# Patient Record
Sex: FEMALE | Race: BLACK OR AFRICAN AMERICAN | NOT HISPANIC OR LATINO | Employment: OTHER | URBAN - METROPOLITAN AREA
[De-identification: names, ages, dates, MRNs, and addresses within clinical notes are randomized per-mention and may not be internally consistent; named-entity substitution may affect disease eponyms.]

---

## 2017-01-18 ENCOUNTER — TRANSFERRED RECORDS (OUTPATIENT)
Dept: HEALTH INFORMATION MANAGEMENT | Facility: CLINIC | Age: 72
End: 2017-01-18

## 2017-02-22 ENCOUNTER — TRANSFERRED RECORDS (OUTPATIENT)
Dept: HEALTH INFORMATION MANAGEMENT | Facility: CLINIC | Age: 72
End: 2017-02-22

## 2017-02-23 ENCOUNTER — TRANSFERRED RECORDS (OUTPATIENT)
Dept: HEALTH INFORMATION MANAGEMENT | Facility: CLINIC | Age: 72
End: 2017-02-23

## 2017-08-24 ENCOUNTER — TRANSFERRED RECORDS (OUTPATIENT)
Dept: HEALTH INFORMATION MANAGEMENT | Facility: CLINIC | Age: 72
End: 2017-08-24

## 2017-11-13 ENCOUNTER — TRANSFERRED RECORDS (OUTPATIENT)
Dept: HEALTH INFORMATION MANAGEMENT | Facility: CLINIC | Age: 72
End: 2017-11-13

## 2017-11-14 ENCOUNTER — TRANSFERRED RECORDS (OUTPATIENT)
Dept: HEALTH INFORMATION MANAGEMENT | Facility: CLINIC | Age: 72
End: 2017-11-14

## 2018-03-05 ENCOUNTER — TRANSFERRED RECORDS (OUTPATIENT)
Dept: HEALTH INFORMATION MANAGEMENT | Facility: CLINIC | Age: 73
End: 2018-03-05

## 2018-07-23 ENCOUNTER — TRANSFERRED RECORDS (OUTPATIENT)
Dept: HEALTH INFORMATION MANAGEMENT | Facility: CLINIC | Age: 73
End: 2018-07-23

## 2018-11-16 ENCOUNTER — TRANSFERRED RECORDS (OUTPATIENT)
Dept: HEALTH INFORMATION MANAGEMENT | Facility: CLINIC | Age: 73
End: 2018-11-16

## 2018-12-31 ENCOUNTER — TRANSFERRED RECORDS (OUTPATIENT)
Dept: HEALTH INFORMATION MANAGEMENT | Facility: CLINIC | Age: 73
End: 2018-12-31

## 2019-04-08 ENCOUNTER — TRANSFERRED RECORDS (OUTPATIENT)
Dept: HEALTH INFORMATION MANAGEMENT | Facility: CLINIC | Age: 74
End: 2019-04-08

## 2019-11-18 ENCOUNTER — TRANSFERRED RECORDS (OUTPATIENT)
Dept: HEALTH INFORMATION MANAGEMENT | Facility: CLINIC | Age: 74
End: 2019-11-18

## 2019-11-19 ENCOUNTER — TRANSFERRED RECORDS (OUTPATIENT)
Dept: HEALTH INFORMATION MANAGEMENT | Facility: CLINIC | Age: 74
End: 2019-11-19

## 2020-02-11 NOTE — TELEPHONE ENCOUNTER
ONCOLOGY INTAKE: Records Information      APPT INFORMATION:  Referring provider: Self  Referring provider s clinic:  N/a  Reason for visit/diagnosis:  History of breast cancer. Transfer of care for long term follow up  Has patient been notified of appointment date and time?: Yes    RECORDS INFORMATION:  Were the records received with the referral (via [employer])? No    Has patient been seen for any external appt for this diagnosis? Yes    If yes, where? [facility] ([facility])    Has patient had any imaging or procedures outside of [place]  [place] for this condition? Yes      If Yes, where?  [facility] ([facility])    ADDITIONAL INFORMATION:  None

## 2020-02-18 NOTE — TELEPHONE ENCOUNTER
Reschedule 2/21 visit with Dr. Lacy to 4/16 with Dr. Slater due to scheduling error. Spoke with Pt.    Please see 2/21 pre-visit with Dr. Lacy

## 2020-02-19 ENCOUNTER — RECORDS - HEALTHEAST (OUTPATIENT)
Dept: ADMINISTRATIVE | Facility: OTHER | Age: 75
End: 2020-02-19

## 2020-02-21 ENCOUNTER — PRE VISIT (OUTPATIENT)
Dept: ONCOLOGY | Facility: CLINIC | Age: 75
End: 2020-02-21

## 2020-03-03 ENCOUNTER — HOSPITAL ENCOUNTER (OUTPATIENT)
Dept: ULTRASOUND IMAGING | Facility: CLINIC | Age: 75
Discharge: HOME | End: 2020-03-03
Attending: INTERNAL MEDICINE

## 2020-03-03 DIAGNOSIS — N18.9 CHRONIC KIDNEY DISEASE, UNSPECIFIED CKD STAGE: ICD-10-CM

## 2020-04-15 ENCOUNTER — PATIENT OUTREACH (OUTPATIENT)
Dept: ONCOLOGY | Facility: CLINIC | Age: 75
End: 2020-04-15

## 2020-04-15 NOTE — PROGRESS NOTES
Received a message that Estephania is declining to have a virtual or phone visit with Dr Slater.    Spoke to Estephania to discuss rational for a phone visit due to COVID 19. She is in agreement with changing to a phone visit at this time and seeing Dr Slater in person at a later date.

## 2020-04-16 ENCOUNTER — VIRTUAL VISIT (OUTPATIENT)
Dept: ONCOLOGY | Facility: CLINIC | Age: 75
End: 2020-04-16
Attending: INTERNAL MEDICINE
Payer: COMMERCIAL

## 2020-04-16 ENCOUNTER — PRE VISIT (OUTPATIENT)
Dept: ONCOLOGY | Facility: CLINIC | Age: 75
End: 2020-04-16

## 2020-04-16 DIAGNOSIS — Z79.811 LONG TERM CURRENT USE OF AROMATASE INHIBITOR: ICD-10-CM

## 2020-04-16 DIAGNOSIS — Z85.3 PERSONAL HISTORY OF MALIGNANT NEOPLASM OF BREAST: Primary | ICD-10-CM

## 2020-04-16 PROCEDURE — 99215 OFFICE O/P EST HI 40 MIN: CPT | Mod: 95 | Performed by: INTERNAL MEDICINE

## 2020-04-16 PROCEDURE — 40000114 ZZH STATISTIC NO CHARGE CLINIC VISIT

## 2020-04-16 RX ORDER — LISINOPRIL 10 MG/1
10 TABLET ORAL DAILY
COMMUNITY
End: 2023-03-31

## 2020-04-16 RX ORDER — INSULIN GLARGINE 100 [IU]/ML
38 INJECTION, SOLUTION SUBCUTANEOUS 2 TIMES DAILY
COMMUNITY
Start: 2020-02-13

## 2020-04-16 RX ORDER — ASPIRIN 81 MG/1
81 TABLET, CHEWABLE ORAL DAILY
COMMUNITY
End: 2023-03-16

## 2020-04-16 RX ORDER — ROSUVASTATIN CALCIUM 10 MG/1
10 TABLET, COATED ORAL DAILY
COMMUNITY

## 2020-04-16 RX ORDER — LETROZOLE 2.5 MG/1
2.5 TABLET, FILM COATED ORAL DAILY
COMMUNITY
Start: 2019-03-14 | End: 2021-11-05

## 2020-04-16 NOTE — PROGRESS NOTES
"Estephania Rubio is a 74 year old female who is being evaluated via a billable telephone visit.      Please call patient on Home phone.     The patient has been notified of following:     \"This telephone visit will be conducted via a call between you and your physician/provider. We have found that certain health care needs can be provided without the need for a physical exam.  This service lets us provide the care you need with a short phone conversation.  If a prescription is necessary we can send it directly to your pharmacy.  If lab work is needed we can place an order for that and you can then stop by our lab to have the test done at a later time.    Telephone visits are billed at different rates depending on your insurance coverage. During this emergency period, for some insurers they may be billed the same as an in-person visit.  Please reach out to your insurance provider with any questions.    If during the course of the call the physician/provider feels a telephone visit is not appropriate, you will not be charged for this service.\"     Physician has received verbal consent for a Telephone Visit from the patient? Yes    How would you like to obtain your AVS? Mail a copy    Estephania Rubio complains of    Chief Complaint   Patient presents with     Telephone     TELEPHONE VISIT; BREAST CANCER        Allergies reviewed: Yes  Medications reviewed: Yes    Medications: Medication refills not needed today.  Pharmacy name entered into EPIC: Data Unavailable      Zainab Romano CMA April 16, 2020  8:42 AM     ALLERGIES  Patient has no allergy information on record.      NEW PATIENT TELEPHONE VISIT      HISTORY OF PRESENT ILLNESS:  I am asked to see Ms. Estephania Rubio as a self referral for a transfer of care of breast cancer as she moved to the [facility].      Estephania is a 74-year-old postmenopausal female with a history of an early stage breast cancer treated in 04/2017.  She reports that she completed therapy 3 " years ago.  I do not have copies of all of her records.  She reports she was treated in [place] in [place], [place], where she had a screening mammogram and was found to have an abnormality.  This was an estrogen receptor-positive, HER2-negative breast cancer.  She was treated with lumpectomy and adjuvant radiation therapy.  She reports she did not require chemotherapy and she started on letrozole.  She has continued on letrozole since that time.  She reports no issues with hot flashes.  She has occasional arthralgias and myalgias.  She does not report having had a bone density scan done.  She last had a screening mammogram in 2019.      She reports overall that she is feeling well.  She has no nodules, lumps or bumps.  No masses.  She reports no fevers or chills, no chest pain or shortness of breath.  No issues with nausea, vomiting, diarrhea or constipation.      She recently moved to the [facility] approximately 3 months ago to be closer to her daughter who lives in [place].  [place]      REVIEW OF SYSTEMS:  Her 10-point review of systems is otherwise negative.      PAST MEDICAL HISTORY:  Significant for type 2 diabetes, hypertension, breast cancer as outlined above, chronic kidney disease, stage IIIB, with a creatinine of 1.8.      MEDICATIONS:  Reviewed in EMR.      ALLERGIES:  No known drug allergies.      SOCIAL HISTORY:  She is single.  She is originally from [place].  She previously went to the [facility] for human rights.  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  She has 2 children, a daughter who lives in [place], a son who lives in [place], [place].  She currently resides in a senior living facility.  No tobacco or alcohol use.      FAMILY HISTORY:  Her sister  of breast cancer at the age of 68 with metastatic disease.  She has other family  members with breast cancer.  She reports she was offered genetic testing and declined this.      PHYSICAL EXAMINATION:  No physical examination was performed today as this was a telephone encounter.  She reports her baseline weight is approximately 160 pounds.  She had a full affect today.      Outside imaging and laboratories were reviewed.  Creatinine of 1.8.  White blood cell count 10.3, hemoglobin 12.8.      Estephania is a 74-year-old female, postmenopausal, with a history of a total abdominal hysterectomy, bilateral salpingo-oophorectomy and a breast cancer treated with a lumpectomy and radiation therapy, completing all therapy in 04/2017.  She did not require chemotherapy.  I do not have staging information.  This was an estrogen receptor-positive, HER2-negative breast cancer.      1.  Breast cancer.  In discussion with Estephania today, she has no concerning symptoms for metastatic disease based on history over the telephone.  I discussed with her I am working on getting her outside records.  I would like her to return in November with a mammogram as well as a bone density scan.  Bone density scan is given the fact that her letrozole can cause bone thinning and osteoporosis.  She will be due at that time.      She will continue her Femara and call if she develops any other symptoms.      2.  Chronic kidney disease, stage III disease.  She has been in contact with Nephrology at [facility] and a workup is in process.      3.  Diabetes.  She is currently on metformin and insulin and is following up with primary care.      I discussed with her from a breast cancer prevention perspective, we recommend regular exercise of at least 150 minutes per week of moderate intensity exercise.  This has been difficult given the COVID  restrictions as well as her moving into a new place.  She is hopeful to begin restarting this.      4.  Health care maintenance.  She reports she had a colonoscopy approximately 3 years ago.  She has been  plugged in with primary care and is working to establish care now that she lives in the [facility].      She has declined genetic testing.      She is coping well.  It was a pleasure to talk to MsJustin Ramiro today.        Phone call duration: 45 minutes    Katie Slater MD

## 2020-04-16 NOTE — TELEPHONE ENCOUNTER
RECORDS STATUS - BREAST    RECORDS REQUESTED FROM: [facility] in [place], [place]    DATE REQUESTED: 4/16/2020    NOTES DETAILS STATUS   OFFICE NOTE from referring provider     OFFICE NOTE from medical oncologist     OFFICE NOTE from surgeon     OFFICE NOTE from radiation oncologist     DISCHARGE SUMMARY from hospital     DISCHARGE REPORT from the ER     OPERATIVE REPORT     MEDICATION LIST     CLINICAL TRIAL TREATMENTS TO DATE     LABS     PATHOLOGY REPORTS  (Tissue diagnosis, Stage, ER/CT percentage positive and intensity of staining, HER2 IHC, FISH, and all biopsies from breast and any distant metastasis)                 Pathology Dept:   Ph: [phone]  Fax: [phone]   Mailing Address:   [address] [place] [address]   They confirmed that there are breast cancer and lymph node pathology slides from 2016 at their facility.     I sent an Urgent fax request to the pathology dept.  Tracking Number:    [id number]    4/17/2020 11:05am   I received a call back from [facility] Pathology Dept- they got the request for path slides yesterday and need to cut the slides from the block. The slides are older so they had to request them from an off-site facility. The slides will be sent out either Monday or Tuesday.       GENONOMIC TESTING     TYPE:   (Next Generation Sequencing, including Foundation One testing, and Oncotype score)     IMAGING (NEED IMAGES & REPORT)     CT SCANS     MRI       MAMMO I spoke to Christine in the Radiology Dept- She confirmed that the pt has mammos and ultrasounds at their facility.     Ph: [phone] #2  Fax: [phone]    IMG disc tracking Number:     [id number]     Complete- IMG disc arrived on 4/17/2020 11/18/2019, 11/16/2018, 12/22/2016, 12/22/2016, 11/13/2017 more in PACS - Mammo   ULTRASOUND Complete US Breast 11/18/2019, 12/31/2018, 12/22/2016 more in PACS   PET     BONE SCAN     BRAIN MRI       Action    Action Taken 4/16/2020 8:59am   I called the main hospital   at [facility] in [facility] Ph: [phone] and they transferred me to the medical records dept.     The Medical Records Dept  (Ph: [phone]) said they will send the records right away but need a formal fax request. I sent a request for records to fax: [phone]. I spoke to a woman named Addis.     I called the Hospital  back to get the phone numbers for the Radiology and Pathology Depts.     Radiology Dept   Ph: [phone] #2- I left a vm requesting a call back.     Pathology Dept:   Ph: [phone]  Fax: [phone]   Mailing Address:   [address] [place] [address]   They confirmed that there are breast cancer and lymph node pathology slides from 2016 at their facility.     I sent an Urgent fax request to the pathology dept.  Tracking Number:     Christine from the Health system Radiology Dept called me back and confirmed that they have Mammos and Ultrasound Images on this pt.   Fax: [phone]    I called the Medical Records Dept back and spoke to a woman named Julia. She said the fax was just received.     9:44am  I sent a fax request and shipping labels to the radiology and pathology depts at [facility] in [facility]     10:21am   I left a vm for pt Estephania requesting a call back.     [facility] sent a fax back requesting an SHIRA for samia Best's medical records. Pending: we need a release form from pt Estephania.     11:35am   I received IMG reports from [facility] and faxed them to HIM.     I also sent a message to Dr. Slater/ clinical staff requesting a direct fax and to see if they had the patient sign a release form today.     3:14pm   I was able to reach pt Estephania and sent her an SHIRA email: [email or web address]. I sent samia Best an SHIRA in the mail.     4/17/2020 10:58am   I called Health system Pathology Dept Ph: [phone]-   I left a detailed vm for the path dept.     11am - I left a vm for pt Estephania requesting a call back and checking on the email I sent yesterday.     4/21/2020  10:58am   I called samia Estephania to check in again - She answered and said the SHIRA letter came in the mail today. She's going to sign it and mail back the return mail envelope soon.     5/5/2020 1:09pm     I received samia Estephania's SHIRA in the mail today and faxed it over to [facility] and to HIM.     3:28pm   I called  [facility] to see if they received the fax request (Ph: [phone]) I left a vm requesting a call back.

## 2020-04-20 NOTE — TELEPHONE ENCOUNTER
Action 04/20/20 12:08 PM - Estrellita   Action Taken  Pathology received from [facility] in [place] and sent to be reviewed with filled out Pathology Form.

## 2020-04-21 PROCEDURE — 00000346 ZZHCL STATISTIC REVIEW OUTSIDE SLIDES TC 88321: Performed by: INTERNAL MEDICINE

## 2020-05-08 LAB — COPATH REPORT: NORMAL

## 2020-05-12 ENCOUNTER — TELEPHONE (OUTPATIENT)
Dept: ONCOLOGY | Facility: CLINIC | Age: 75
End: 2020-05-12

## 2020-05-12 NOTE — TELEPHONE ENCOUNTER
Action 05/12/20 12:08 PM - Estrellita   Action Taken  Over 100 pages of records received from [facility] in [place], [place] and sent to [facility] to be scanned into the chart.

## 2020-09-10 ENCOUNTER — TELEPHONE (OUTPATIENT)
Dept: ONCOLOGY | Facility: CLINIC | Age: 75
End: 2020-09-10

## 2020-09-10 NOTE — TELEPHONE ENCOUNTER
"Pt CO Left breast pain and a hard lump (which the Pt insists is not a lump but \"tissue\") in the axillary region about the size of a grape. No redness or swelling. Pain is rated 6-7/10 when touched. Pt was unable to describe pain quality. Pt has no other Sx.  Pt has appointment on 10/5 and wonders if she should be seen earlier.  Paged Dr Slater  Per Dr Slater Pt should be seen, can see Pt on 9/14 over lunch or @ 930 on 9/17. Pt needs a diagnostic mammogram prior, previous mammo should be altered. Informed Pt who verbalized understanding and messaged scheduling.  "

## 2020-09-11 ENCOUNTER — PATIENT OUTREACH (OUTPATIENT)
Dept: ONCOLOGY | Facility: CLINIC | Age: 75
End: 2020-09-11

## 2020-09-11 DIAGNOSIS — N64.4 BREAST PAIN: ICD-10-CM

## 2020-09-11 DIAGNOSIS — Z85.3 PERSONAL HISTORY OF MALIGNANT NEOPLASM OF BREAST: Primary | ICD-10-CM

## 2020-09-14 ENCOUNTER — ANCILLARY PROCEDURE (OUTPATIENT)
Dept: MAMMOGRAPHY | Facility: CLINIC | Age: 75
End: 2020-09-14
Payer: COMMERCIAL

## 2020-09-14 ENCOUNTER — TELEPHONE (OUTPATIENT)
Dept: ONCOLOGY | Facility: CLINIC | Age: 75
End: 2020-09-14

## 2020-09-14 ENCOUNTER — ONCOLOGY VISIT (OUTPATIENT)
Dept: ONCOLOGY | Facility: CLINIC | Age: 75
End: 2020-09-14
Attending: INTERNAL MEDICINE
Payer: COMMERCIAL

## 2020-09-14 VITALS
OXYGEN SATURATION: 99 % | HEART RATE: 71 BPM | WEIGHT: 196 LBS | SYSTOLIC BLOOD PRESSURE: 161 MMHG | DIASTOLIC BLOOD PRESSURE: 92 MMHG | TEMPERATURE: 98.7 F | RESPIRATION RATE: 16 BRPM

## 2020-09-14 DIAGNOSIS — C50.412 MALIGNANT NEOPLASM OF UPPER-OUTER QUADRANT OF LEFT BREAST IN FEMALE, ESTROGEN RECEPTOR POSITIVE (H): ICD-10-CM

## 2020-09-14 DIAGNOSIS — N64.4 BREAST PAIN: ICD-10-CM

## 2020-09-14 DIAGNOSIS — Z17.0 MALIGNANT NEOPLASM OF UPPER-OUTER QUADRANT OF LEFT BREAST IN FEMALE, ESTROGEN RECEPTOR POSITIVE (H): ICD-10-CM

## 2020-09-14 DIAGNOSIS — Z85.3 PERSONAL HISTORY OF MALIGNANT NEOPLASM OF BREAST: ICD-10-CM

## 2020-09-14 DIAGNOSIS — Z79.811 USE OF AROMATASE INHIBITORS: ICD-10-CM

## 2020-09-14 DIAGNOSIS — R92.8 ABNORMAL FINDING ON BREAST IMAGING: Primary | ICD-10-CM

## 2020-09-14 PROCEDURE — 99214 OFFICE O/P EST MOD 30 MIN: CPT | Mod: ZP | Performed by: INTERNAL MEDICINE

## 2020-09-14 PROCEDURE — G0463 HOSPITAL OUTPT CLINIC VISIT: HCPCS | Mod: ZF

## 2020-09-14 RX ORDER — GLIPIZIDE 10 MG/1
10 TABLET, FILM COATED, EXTENDED RELEASE ORAL
COMMUNITY
Start: 2020-08-28 | End: 2023-03-31

## 2020-09-14 ASSESSMENT — PAIN SCALES - GENERAL: PAINLEVEL: NO PAIN (0)

## 2020-09-14 NOTE — LETTER
9/14/2020         RE: Estephania Rubio  [address]  [facility] [address]        Dear Colleague,    Thank you for referring your patient, Estephania Rubio, to the [facility]. Please see a copy of my visit note below.    Oncology Follow-up Visit:  September 14, 2020    Diagnosis: left breast cancer Er + her2 negative on letrozole    History Of Present Illness:  Ms. Rubio is a 75 year old female is here for follow-up of breast cancer.    Estephania is a 75-year-old postmenopausal female with a history of an early stage breast cancer treated in 04/2017.  She reports that she completed therapy 3 years ago.  I do not have copies of all of her records.  She reports she was treated in [place] in [place], [place], where she had a screening mammogram and was found to have an abnormality.  This was an estrogen receptor-positive, HER2-negative breast cancer.  She was treated with lumpectomy and adjuvant radiation therapy.  She reports she did not require chemotherapy and she started on letrozole.  She has continued on letrozole since that time.  She reports no issues with hot flashes.  She has occasional arthralgias and myalgias.  She does not report having had a bone density scan done.  She last had a screening mammogram in 11/2019.      She reports overall that she is feeling well.  She has no nodules, lumps or bumps.  No masses.  She reports no fevers or chills, no chest pain or shortness of breath.  No issues with nausea, vomiting, diarrhea or constipation.     Estephania comes in today on an add on basis complaining of tenderness in her left breast and a fullness in her left axilla.  She reports she has had intermittent tenderness in her left breast since her treatment.  She thinks it is more prominent now.   She is unsure whether she has noticed a mass or lump.      No f/c.  No chest pain or shortness of breath.    She reports a decrease in her energy overall over the last several  "months.  She is not sure if this is related to covid, or a lack of motivation or other.      She has no other pain.      She is an active \"busy body\" but doesn't feel like doing as much lately.    Review Of Systems:   ROS: 10 point ROS neg other than the symptoms noted above in the HPI.      Past medical, social, surgical, and family histories reviewed.    Allergies:  Allergies as of 09/14/2020     (No Known Allergies)       Current Medications:  Current Outpatient Medications   Medication Sig Dispense Refill     aspirin (ASA) 81 MG chewable tablet Take 81 mg by mouth daily       empagliflozin (JARDIANCE) 10 MG TABS tablet Take 10 mg by mouth daily       insulin glargine (BASAGLAR KWIKPEN) 100 UNIT/ML pen TAKE 55 UNITS DAILY       letrozole (FEMARA) 2.5 MG tablet Take 2.5 mg by mouth daily       lisinopril (ZESTRIL) 10 MG tablet Take 10 mg by mouth daily       rosuvastatin (CRESTOR) 10 MG tablet Take 10 mg by mouth daily       sitagliptin (JANUVIA) 100 MG tablet Take 100 mg by mouth daily          Physical Exam:  There were no vitals taken for this visit.    GENERAL APPEARANCE: healthy, alert and no distress     HENT: Mouth without ulcers or lesions     NECK: no adenopathy, no asymmetry or masses     LYMPHATICS: No cervical, supraclavicular, axillary or inguinal lymphadenopathy     RESP: lungs clear to auscultation - no rales, rhonchi or wheezes     CARDIOVASCULAR: regular rates and rhythm, normal S1 S2, no S3 or S4 and no murmur.     BREAST:  Symmetric, no masses or axillary adenopathy in the right breast, scar in UOQ of the left breast with associated tenderness.  I do not appreciate any axillary adenopathy or masses in the breast.  She does have a subcentimeter cutaneous lesion in the skin in the axilla that is soft, movable.  No associated erythema.     ABDOMEN:  soft, nontender, no HSM or masses and bowel sounds normal     MUSCULOSKELETAL: extremities normal- no gross deformities noted, no evidence of " inflammation in joints, FROM in all extremities. No edema b/l LE.     SKIN: no suspicious lesions or rashes     PSYCHIATRIC: mentation appears normal and affect normal    Laboratory/Imaging Studies  No visits with results within 2 Week(s) from this visit.   Latest known visit with results is:   Orders Only on 04/21/2020   Component Date Value Ref Range Status     Copath Report 04/21/2020    Final                    Value:Patient Name: MIMI JONES  MR#: [id number]  Specimen #: [id number]  Collected: 4/21/2020  Received: 4/21/2020  Reported: 5/8/2020 17:43  Ordering Phy(s): [facility]  Copy To: [facility]  Department of Pathology  [facility] [place] [facility] [place]  [address]  Phone: [phone]  Fax: [phone]    For improved result formatting, select 'View Enhanced Report Format' under   Linked Documents section.    TEST(S):  A: 1 Slide, case #[id number]  B: 1 Slide, case #[id number]    FINAL DIAGNOSIS:  I. 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 CASE FROM [place], [place], ([id number],   OBTAINED 12/22/2016):    C. 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    One representative H&E slide is provided from the left breast lumpectomy   specimen (slide C2).  Per outside  report, atypical lobular hyperplasia was also present, surgical margins   were uninvolved by carcinoma, and no  lymphovascular invasion was identified.  Per outside report, one left   axillary sentinel lymph node and three  left axillary non-sentinel lymph nodes were removed and were benign.    I have personally reviewed all specimens and/or                           slides, including the   listed special stains, and used them  with my medical judgement to determine or confirm the final diagnosis.    Electronically signed out by:    Daisy Urias M.D., [facility]    CLINICAL HISTORY:  The patient is a 75-year-old woman.    GROSS:  Received from [facility] in [place], [place], is 1 stained   slide labeled [id number] (obtained  11/11/2016), 1 stained slide labeled [id number] (obtained 12/22/2016) and a   copy of the referring pathologist's  report with patient identifying information.  All slides are returned.    One slide (C2) is digitally scanned  prior to return.    MICROSCOPIC:  Microscopic examination is performed.    The technical component of this testing was completed at the [facility], with the professional component performed   at the [facility], [address] [place] [id number] ([phone])                              CPT Codes:  A: 32078-UBB9  B: 32944-GZN9    COLLECTION SITE:  Client:  [facility]  [facility]           ASSESSMENT/PLAN:    Estephania is a 75-year-old female, postmenopausal, with a history of a total abdominal hysterectomy, bilateral salpingo-oophorectomy and a breast cancer treated with a lumpectomy and radiation therapy, completing all therapy  in 04/2017.  She did not require chemotherapy.  I do not have staging information.  This was an estrogen receptor-positive, HER2-negative breast cancer.      1.  Breast cancer.   I reviewed her imaging from today.  It is recommended that Estephania undergo a biopsy.  We discussed this could be fibrosis vs fat necrosis vs tumor vs other . Await biopsy.    Assuming this is negative, will have patient return in 6 months.       She will continue her Femara and call if she develops any other symptoms.      2.  Chronic kidney disease, stage III disease.  She has been in contact with Nephrology at [facility] and a workup is in process.      3.  Diabetes.  She is currently on metformin and insulin and is following up with primary care.        4.  Health care maintenance.  She reports she had a colonoscopy approximately 3 years ago.  She has been plugged in with primary care and is working to establish care now that she lives in the [facility].      She has declined genetic testing.      She is coping well.  It was a pleasure to talk to Ms. Rubio today.          Again, thank you for allowing me to participate in the care of your patient.        Sincerely,        Katie Slater MD

## 2020-09-14 NOTE — NURSING NOTE
Oncology Rooming Note    September 14, 2020 12:38 PM   Estephania Rubio is a 75 year old female who presents for:    Chief Complaint   Patient presents with     Oncology Clinic Visit     Return; Hx of Breast Ca     Initial Vitals: BP (!) 161/92   Pulse 71   Temp 98.7  F (37.1  C) (Oral)   Resp 16   Wt 88.9 kg (196 lb)   SpO2 99%  There is no height or weight on file to calculate BMI. There is no height or weight on file to calculate BSA.  No Pain (0) Comment: Data Unavailable   No LMP recorded. Patient has had a hysterectomy.  Allergies reviewed: Yes  Medications reviewed: Yes    Medications: Medication refills not needed today.  Pharmacy name entered into [facility]: [id number] [place] [address]    Clinical concerns: Pt declined height. Dr Slater was NOT notified.      Shreya Campbell [facility]

## 2020-09-14 NOTE — PROGRESS NOTES
"Oncology Follow-up Visit:  September 14, 2020    Diagnosis: left breast cancer Er + her2 negative on letrozole    History Of Present Illness:  Ms. Rubio is a 75 year old female is here for follow-up of breast cancer.    Estephania is a 75-year-old postmenopausal female with a history of an early stage breast cancer treated in 04/2017.  She reports that she completed therapy 3 years ago.  I do not have copies of all of her records.  She reports she was treated in [place] in [place], [place], where she had a screening mammogram and was found to have an abnormality.  This was an estrogen receptor-positive, HER2-negative breast cancer.  She was treated with lumpectomy and adjuvant radiation therapy.  She reports she did not require chemotherapy and she started on letrozole.  She has continued on letrozole since that time.  She reports no issues with hot flashes.  She has occasional arthralgias and myalgias.  She does not report having had a bone density scan done.  She last had a screening mammogram in 11/2019.      She reports overall that she is feeling well.  She has no nodules, lumps or bumps.  No masses.  She reports no fevers or chills, no chest pain or shortness of breath.  No issues with nausea, vomiting, diarrhea or constipation.     Estephania comes in today on an add on basis complaining of tenderness in her left breast and a fullness in her left axilla.  She reports she has had intermittent tenderness in her left breast since her treatment.  She thinks it is more prominent now.   She is unsure whether she has noticed a mass or lump.      No f/c.  No chest pain or shortness of breath.    She reports a decrease in her energy overall over the last several months.  She is not sure if this is related to covid, or a lack of motivation or other.      She has no other pain.      She is an active \"busy body\" but doesn't feel like doing as much lately.    Review Of Systems:   ROS: 10 point ROS neg other than the symptoms " noted above in the HPI.      Past medical, social, surgical, and family histories reviewed.    Allergies:  Allergies as of 09/14/2020     (No Known Allergies)       Current Medications:  Current Outpatient Medications   Medication Sig Dispense Refill     aspirin (ASA) 81 MG chewable tablet Take 81 mg by mouth daily       empagliflozin (JARDIANCE) 10 MG TABS tablet Take 10 mg by mouth daily       insulin glargine (BASAGLAR KWIKPEN) 100 UNIT/ML pen TAKE 55 UNITS DAILY       letrozole (FEMARA) 2.5 MG tablet Take 2.5 mg by mouth daily       lisinopril (ZESTRIL) 10 MG tablet Take 10 mg by mouth daily       rosuvastatin (CRESTOR) 10 MG tablet Take 10 mg by mouth daily       sitagliptin (JANUVIA) 100 MG tablet Take 100 mg by mouth daily          Physical Exam:  There were no vitals taken for this visit.    GENERAL APPEARANCE: healthy, alert and no distress     HENT: Mouth without ulcers or lesions     NECK: no adenopathy, no asymmetry or masses     LYMPHATICS: No cervical, supraclavicular, axillary or inguinal lymphadenopathy     RESP: lungs clear to auscultation - no rales, rhonchi or wheezes     CARDIOVASCULAR: regular rates and rhythm, normal S1 S2, no S3 or S4 and no murmur.     BREAST:  Symmetric, no masses or axillary adenopathy in the right breast, scar in UOQ of the left breast with associated tenderness.  I do not appreciate any axillary adenopathy or masses in the breast.  She does have a subcentimeter cutaneous lesion in the skin in the axilla that is soft, movable.  No associated erythema.     ABDOMEN:  soft, nontender, no HSM or masses and bowel sounds normal     MUSCULOSKELETAL: extremities normal- no gross deformities noted, no evidence of inflammation in joints, FROM in all extremities. No edema b/l LE.     SKIN: no suspicious lesions or rashes     PSYCHIATRIC: mentation appears normal and affect normal    Laboratory/Imaging Studies  No visits with results within 2 Week(s) from this visit.   Latest known  visit with results is:   Orders Only on 04/21/2020   Component Date Value Ref Range Status     Copath Report 04/21/2020    Final                    Value:Patient Name: MIMI JONES  MR#: [id number]  Specimen #: [id number]  Collected: 4/21/2020  Received: 4/21/2020  Reported: 5/8/2020 17:43  Ordering Phy(s): MT SUTHERLAND  Copy To: [facility]  Department of Pathology  [address] [place] [address].  [place] [address]  Phone: [phone]  Fax: [phone]    For improved result formatting, select 'View Enhanced Report Format' under   Linked Documents section.    TEST(S):  A: 1 Slide, case #[id number]  B: 1 Slide, case #[id number]    FINAL DIAGNOSIS:  I. CASE FROM [place], [place] ([id number],   OBTAINED 11/11/2016):  BREAST, LEFT, 1 O'CLOCK, 10 CM FROM NIPPLE, CORE BIOPSY  - INVASIVE MAMMARY CARCINOMA OF NO SPECIAL TYPE (INVASIVE DUCTAL   CARCINOMA), Bristol grade 1  - Invasive carcinoma is reportedly ER positive (100%, strong intensity),   AK positive (90%, intermediate  intensity) and HER2 negative (score 1+) (immunostains performed and   interpreted at the outside institution;  slides not prov                          ided for our review)    II. CASE FROM [facility], [place], [place], ([id number],   OBTAINED 12/22/2016):    C. BREAST, LEFT, WIRE LOCALIZED LUMPECTOMY:  - INVASIVE MAMMARY CARCINOMA OF NO SPECIAL TYPE (INVASIVE DUCTAL   CARCINOMA), Bristol grade 1 (tubule  formation 1+ nuclear pleomorphism 2+ mitotic activity 1 = Bristol score   4), size 6.5 mm per outside report  - Pathologic stage pT1bN0(sn) per outside report    COMMENT:  One representative H&E slide is provided from the left breast core biopsy   specimen (slide 1).    One representative H&E slide is provided from the left breast lumpectomy   specimen (slide C2).  Per outside  report, atypical lobular hyperplasia was also present, surgical margins   were uninvolved by carcinoma, and  no  lymphovascular invasion was identified.  Per outside report, one left   axillary sentinel lymph node and three  left axillary non-sentinel lymph nodes were removed and were benign.    I have personally reviewed all specimens and/or                           slides, including the   listed special stains, and used them  with my medical judgement to determine or confirm the final diagnosis.    Electronically signed out by:    Daisy Urias M.D., [facility]    CLINICAL HISTORY:  The patient is a 75-year-old woman.    GROSS:  Received from [facility] in [place], [place], is 1 stained   slide labeled [id number] (obtained  11/11/2016), 1 stained slide labeled [id number] (obtained 12/22/2016) and a   copy of the referring pathologist's  report with patient identifying information.  All slides are returned.    One slide (C2) is digitally scanned  prior to return.    MICROSCOPIC:  Microscopic examination is performed.    The technical component of this testing was completed at the [facility], with the professional component performed   at the [facility], [address] [place] [id number] ([phone])                              CPT Codes:  A: 82436-VCE9  B: 93958-LFJ8    COLLECTION SITE:  Client:  [facility]  Location:  [id number] (B)           ASSESSMENT/PLAN:    Estephania is a 75-year-old female, postmenopausal, with a history of a total abdominal hysterectomy, bilateral salpingo-oophorectomy and a breast cancer treated with a lumpectomy and radiation therapy, completing all therapy in 04/2017.  She did not require chemotherapy.  I do not have staging information.  This was an estrogen receptor-positive, HER2-negative breast cancer.      1.  Breast cancer.   I reviewed her imaging from today.  It is recommended that Estephania undergo a biopsy.  We  discussed this could be fibrosis vs fat necrosis vs tumor vs other . Await biopsy.    Assuming this is negative, will have patient return in 6 months.       She will continue her Femara and call if she develops any other symptoms.      2.  Chronic kidney disease, stage III disease.  She has been in contact with Nephrology at [facility] and a workup is in process.      3.  Diabetes.  She is currently on metformin and insulin and is following up with primary care.        4.  Health care maintenance.  She reports she had a colonoscopy approximately 3 years ago.  She has been plugged in with primary care and is working to establish care now that she lives in the [facility].      She has declined genetic testing.      She is coping well.  It was a pleasure to talk to Ms. Rubio today.

## 2020-09-17 ENCOUNTER — ANCILLARY PROCEDURE (OUTPATIENT)
Dept: MAMMOGRAPHY | Facility: CLINIC | Age: 75
End: 2020-09-17
Attending: INTERNAL MEDICINE
Payer: COMMERCIAL

## 2020-09-17 DIAGNOSIS — N63.0 BREAST MASS: ICD-10-CM

## 2020-09-17 RX ORDER — LIDOCAINE HYDROCHLORIDE 10 MG/ML
10 INJECTION, SOLUTION EPIDURAL; INFILTRATION; INTRACAUDAL; PERINEURAL ONCE
Status: COMPLETED | OUTPATIENT
Start: 2020-09-17 | End: 2020-09-17

## 2020-09-17 RX ADMIN — LIDOCAINE HYDROCHLORIDE 10 ML: 10 INJECTION, SOLUTION EPIDURAL; INFILTRATION; INTRACAUDAL; PERINEURAL at 14:30

## 2020-09-22 ENCOUNTER — TELEPHONE (OUTPATIENT)
Dept: MAMMOGRAPHY | Facility: CLINIC | Age: 75
End: 2020-09-22

## 2020-09-22 LAB — COPATH REPORT: NORMAL

## 2020-09-22 NOTE — TELEPHONE ENCOUNTER
Spoke to Estephania about the benign finding from her breast biopsy done last week.  We discussed the Radiologist's recommendation of continuing with her plan set up by her Oncologist regarding future imaging.  Estephania voiced concern about the pain she has been experiencing that originally brought her in for imaging.  Writer will reach out to Dr. Katie Slater and her team regarding guidance for Estephania.  She verbalized understanding and all questions and concern were answered at this time.

## 2020-09-28 ENCOUNTER — TELEPHONE (OUTPATIENT)
Dept: ONCOLOGY | Facility: CLINIC | Age: 75
End: 2020-09-28

## 2020-09-28 NOTE — TELEPHONE ENCOUNTER
I spoke with Estephania.  I reviewed her biopsy demonstrating fat necrosis.  I discussed with her that this is not cancerous.  The tenderness in her breast may persist.      I recommended that she follow up in 6 months.    She expressed understanding and thanked me for the call.    Katie Slater

## 2021-01-15 ENCOUNTER — HEALTH MAINTENANCE LETTER (OUTPATIENT)
Age: 76
End: 2021-01-15

## 2021-01-28 ENCOUNTER — IMMUNIZATION (OUTPATIENT)
Dept: NURSING | Facility: CLINIC | Age: 76
End: 2021-01-28
Payer: COMMERCIAL

## 2021-01-28 PROCEDURE — 0001A PR COVID VAC PFIZER DIL RECON 30 MCG/0.3 ML IM: CPT

## 2021-01-28 PROCEDURE — 91300 PR COVID VAC PFIZER DIL RECON 30 MCG/0.3 ML IM: CPT

## 2021-02-02 ENCOUNTER — COMMUNICATION - HEALTHEAST (OUTPATIENT)
Dept: SCHEDULING | Facility: CLINIC | Age: 76
End: 2021-02-02

## 2021-02-18 ENCOUNTER — IMMUNIZATION (OUTPATIENT)
Dept: NURSING | Facility: CLINIC | Age: 76
End: 2021-02-18
Attending: INTERNAL MEDICINE
Payer: COMMERCIAL

## 2021-02-18 PROCEDURE — 0002A PR COVID VAC PFIZER DIL RECON 30 MCG/0.3 ML IM: CPT

## 2021-02-18 PROCEDURE — 91300 PR COVID VAC PFIZER DIL RECON 30 MCG/0.3 ML IM: CPT

## 2021-04-12 ENCOUNTER — ONCOLOGY VISIT (OUTPATIENT)
Dept: ONCOLOGY | Facility: CLINIC | Age: 76
End: 2021-04-12
Attending: INTERNAL MEDICINE
Payer: COMMERCIAL

## 2021-04-12 VITALS
TEMPERATURE: 97.9 F | HEART RATE: 83 BPM | WEIGHT: 191.7 LBS | OXYGEN SATURATION: 98 % | SYSTOLIC BLOOD PRESSURE: 149 MMHG | DIASTOLIC BLOOD PRESSURE: 88 MMHG

## 2021-04-12 DIAGNOSIS — Z17.0 MALIGNANT NEOPLASM OF UPPER-OUTER QUADRANT OF LEFT BREAST IN FEMALE, ESTROGEN RECEPTOR POSITIVE (H): Primary | ICD-10-CM

## 2021-04-12 DIAGNOSIS — C50.412 MALIGNANT NEOPLASM OF UPPER-OUTER QUADRANT OF LEFT BREAST IN FEMALE, ESTROGEN RECEPTOR POSITIVE (H): Primary | ICD-10-CM

## 2021-04-12 DIAGNOSIS — Z79.811 USE OF AROMATASE INHIBITORS: ICD-10-CM

## 2021-04-12 DIAGNOSIS — I63.419 CEREBROVASCULAR ACCIDENT (CVA) DUE TO EMBOLISM OF MIDDLE CEREBRAL ARTERY, UNSPECIFIED BLOOD VESSEL LATERALITY (H): ICD-10-CM

## 2021-04-12 PROCEDURE — G0463 HOSPITAL OUTPT CLINIC VISIT: HCPCS

## 2021-04-12 PROCEDURE — 99214 OFFICE O/P EST MOD 30 MIN: CPT | Performed by: INTERNAL MEDICINE

## 2021-04-12 ASSESSMENT — PAIN SCALES - GENERAL: PAINLEVEL: NO PAIN (0)

## 2021-04-12 NOTE — PROGRESS NOTES
Oncology Follow-up Visit:  April 12, 2021    Diagnosis: left breast cancer Er + her2 negative on letrozole    History Of Present Illness:  Ms. Rubio is a 75 year old female is here for follow-up of breast cancer.    Estephania is a 75-year-old postmenopausal female with a history of an early stage breast cancer treated in 04/2017.  She reports that she completed therapy 3 years ago.  I do not have copies of all of her records.  She reports she was treated in [place] in [place], [place], where she had a screening mammogram and was found to have an abnormality.  This was an estrogen receptor-positive, HER2-negative breast cancer.  She was treated with lumpectomy and adjuvant radiation therapy.  She reports she did not require chemotherapy and she started on letrozole.  She has continued on letrozole since that time.  She reports no issues with hot flashes.  She has occasional arthralgias and myalgias.   She last had a screening mammogram in Sept 2020 that required a biopsy which was ultimately proven to be fat necrosis.     She has had a difficult year.  She has been hospitalized with multiple acute and chronic strokes.  She is now in a memory care unit.    She is at [facility].  She is here with her daughter, who says the patient is physically doing well, but often will not participate in OT programs or group therapies.      She reports overall that she is feeling well.  She has no nodules, lumps or bumps.  No masses.  She reports no fevers or chills, no chest pain or shortness of breath.  No issues with nausea, vomiting, diarrhea or constipation.      Review Of Systems:   ROS: 10 point ROS neg other than the symptoms noted above in the HPI.      Past medical, social, surgical, and family histories reviewed.    Allergies:  Allergies as of 04/12/2021     (No Known Allergies)       Current Medications:  Current Outpatient Medications   Medication Sig Dispense Refill     aspirin (ASA) 81 MG chewable tablet Take 81  mg by mouth daily       empagliflozin (JARDIANCE) 10 MG TABS tablet Take 10 mg by mouth daily       glipiZIDE (GLUCOTROL XL) 10 MG 24 hr tablet Take 10 mg by mouth       insulin glargine (BASAGLAR KWIKPEN) 100 UNIT/ML pen TAKE 55 UNITS DAILY       letrozole (FEMARA) 2.5 MG tablet Take 2.5 mg by mouth daily       lisinopril (ZESTRIL) 10 MG tablet Take 10 mg by mouth daily       rosuvastatin (CRESTOR) 10 MG tablet Take 10 mg by mouth daily       sitagliptin (JANUVIA) 100 MG tablet Take 100 mg by mouth daily          Physical Exam:  BP (!) 149/88   Pulse 83   Temp 97.9  F (36.6  C) (Oral)   Wt 87 kg (191 lb 11.2 oz)   SpO2 98%     GENERAL APPEARANCE: healthy, alert and no distress     HENT: Mouth without ulcers or lesions     NECK: no adenopathy, no asymmetry or masses     LYMPHATICS: No cervical, supraclavicular, axillary or inguinal lymphadenopathy     RESP: lungs clear to auscultation - no rales, rhonchi or wheezes     CARDIOVASCULAR: regular rates and rhythm, normal S1 S2, no S3 or S4 and no murmur.     BREAST:  Symmetric, no masses or axillary adenopathy in the right breast, scar in UOQ of the left breast with associated tenderness.  I do not appreciate any axillary adenopathy or masses in the breast.  She does have a subcentimeter cutaneous lesion in the skin in the axilla that is soft, movable - this is unchanged from prior evaluation.  No associated erythema.     ABDOMEN:  soft, nontender, no HSM or masses and bowel sounds normal     MUSCULOSKELETAL: extremities normal- no gross deformities noted, no evidence of inflammation in joints, FROM in all extremities. No edema b/l LE.     SKIN: no suspicious lesions or rashes     PSYCHIATRIC: mentation appears normal and affect normal    Laboratory/Imaging Studies     Mammogram Sept 2020 - benign    ASSESSMENT/PLAN:    Estephania is a 75-year-old female, postmenopausal, with a history of a total abdominal hysterectomy, bilateral salpingo-oophorectomy and a breast cancer  treated with a lumpectomy and radiation therapy, completing all therapy in 04/2017.  She did not require chemotherapy.  I do not have staging information.  This was an estrogen receptor-positive, HER2-negative breast cancer.      1.  Breast cancer.   On exam, she has no s/s of recurrent breast cancer.  I discussed with her and her daughter that I would like her to discontinue her femara.  She thinks she already has.  Occasionally femara can increase bp and cholesterol.  I'd like her to remain off of the treatment given she has no evidence of disease, and has other comorbidities at this time.       Mammogram in Sept.    2.  Chronic kidney disease, stage III disease.  She has been in contact with Nephrology at [facility] and a workup is in process.      3.  Diabetes.  She is currently on metformin and insulin and is following up with primary care.        4.  Health care maintenance.  She reports she had a colonoscopy approximately 3 years ago.  She has been plugged in with primary care and is working to establish care now that she lives in the [facility].     5. 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  She is being worked up with cardiology and neurology.       She has declined genetic testing.      She is coping well.      Katie Slater MD

## 2021-04-12 NOTE — NURSING NOTE
Oncology Rooming Note    April 12, 2021 2:22 PM   Estephania Rubio is a 75 year old female who presents for:    Chief Complaint   Patient presents with     Oncology Clinic Visit     RETURN; BREAST CANCER     Initial Vitals: Pulse 80   Wt 87 kg (191 lb 11.2 oz)   SpO2 98%  There is no height or weight on file to calculate BMI. There is no height or weight on file to calculate BSA.  Data Unavailable Comment: Data Unavailable   No LMP recorded. Patient has had a hysterectomy.  Allergies reviewed: Yes  Medications reviewed: Yes    Medications: Medication refills not needed today.  Pharmacy name entered into [facility]: [facility] [id number] [place] [address]    Clinical concerns: dementia concerns      Vijaya Alonso CMA

## 2021-04-12 NOTE — LETTER
Date:April 13, 2021      Patient was self referred, no letter generated. Do not send.         Health Information

## 2021-04-28 ENCOUNTER — RECORDS - HEALTHEAST (OUTPATIENT)
Dept: LAB | Facility: CLINIC | Age: 76
End: 2021-04-28

## 2021-04-29 LAB
ANION GAP SERPL CALCULATED.3IONS-SCNC: 7 MMOL/L (ref 5–18)
BUN SERPL-MCNC: 17 MG/DL (ref 8–28)
CALCIUM SERPL-MCNC: 9.8 MG/DL (ref 8.5–10.5)
CHLORIDE BLD-SCNC: 105 MMOL/L (ref 98–107)
CO2 SERPL-SCNC: 27 MMOL/L (ref 22–31)
CREAT SERPL-MCNC: 1.22 MG/DL (ref 0.6–1.1)
GFR SERPL CREATININE-BSD FRML MDRD: 43 ML/MIN/1.73M2
GLUCOSE BLD-MCNC: 134 MG/DL (ref 70–125)
HBA1C MFR BLD: 9.7 %
POTASSIUM BLD-SCNC: 4.3 MMOL/L (ref 3.5–5)
SODIUM SERPL-SCNC: 139 MMOL/L (ref 136–145)

## 2021-05-15 ENCOUNTER — HEALTH MAINTENANCE LETTER (OUTPATIENT)
Age: 76
End: 2021-05-15

## 2021-06-14 NOTE — TELEPHONE ENCOUNTER
Call from Callie, verbal consent received from patient to speak with her.     Reports that they found out yesterday that patient has not been taking her diabetes medications for a couple months.     Glucose was at 450.  They started giving her the medications yesterday.  Lowest they were able to get the reading was 380.     Was having confusion over this same time frame and had talked to a neurologist this morning.  Thought she was maybe having dementia.     10 minutes ago the reading came back out of range.     Reports that they are now seeing a primary doctor at [facility].  Patient no longer has [facility] primary.     Advised ED per protocol.     Yisel Guerrero RN/[facility]    COVID 19 Nurse Triage Plan/Patient Instructions    Please be aware that novel coronavirus (COVID-19) may be circulating in the community. If you develop symptoms such as fever, cough, or SOB or if you have concerns about the presence of another infection including coronavirus (COVID-19), please contact your health care provider or visit [email or web address].     Disposition/Instructions    ED Visit recommended. Follow protocol based instructions.      Bring Your Own Device:  Please also bring your smart device(s) (smart phones, tablets, laptops) and their charging cables for your personal use and to communicate with your care team during your visit.      Thank you for taking steps to prevent the spread of this virus.  o Limit your contact with others.  o Wear a simple mask to cover your cough.  o Wash your hands well and often.    Resources    [facility]: About COVID-19: [email or web address]    CDC: What to Do If You're Sick: www.cdc.gov/coronavirus/2019-ncov/about/steps-when-sick.html    CDC: Ending Home Isolation: www.cdc.gov/coronavirus/2019-ncov/hcp/disposition-in-home-patients.html     CDC: Caring for Someone: www.cdc.gov/coronavirus/2019-ncov/if-you-are-sick/care-for-someone.html     [facility]: Interim Guidance for  Hospital Discharge to Home: www.health.[facility].[place]/diseases/coronavirus/hcp/hospdischarge.pdf    [facility] clinical trials (COVID-19 research studies): [email or web address]     Below are the COVID-19 hotlines at the [place] [facility] ([facility]). Interpreters are available.   o For health questions: Call 444-156-2411 or 1-577.671.7358 (7 a.m. to 7 p.m.)  o For questions about schools and childcare: Call 876-386-6711 or 1-734.974.2093 (7 a.m. to 7 p.m.)     Reason for Disposition    Blood glucose > 500 mg/dL (27.8 mmol/L)    Additional Information    Negative: Unconscious or difficult to awaken    Negative: Acting confused (e.g., disoriented, slurred speech)    Negative: Very weak (e.g., can't stand)    Negative: Sounds like a life-threatening emergency to the triager    Negative: [1] Vomiting AND [2] signs of dehydration (e.g., very dry mouth, lightheaded, dark urine)    Negative: [1] Blood glucose > 240 mg/dL (13.3 mmol/L) AND [2] rapid breathing    Protocols used: DIABETES - HIGH BLOOD SUGAR-A-

## 2021-06-16 ENCOUNTER — RECORDS - HEALTHEAST (OUTPATIENT)
Dept: LAB | Facility: CLINIC | Age: 76
End: 2021-06-16

## 2021-06-16 LAB
ANION GAP SERPL CALCULATED.3IONS-SCNC: 9 MMOL/L (ref 5–18)
BUN SERPL-MCNC: 25 MG/DL (ref 8–28)
CALCIUM SERPL-MCNC: 9 MG/DL (ref 8.5–10.5)
CHLORIDE BLD-SCNC: 102 MMOL/L (ref 98–107)
CO2 SERPL-SCNC: 24 MMOL/L (ref 22–31)
CREAT SERPL-MCNC: 1.53 MG/DL (ref 0.6–1.1)
GFR SERPL CREATININE-BSD FRML MDRD: 33 ML/MIN/1.73M2
GLUCOSE BLD-MCNC: 359 MG/DL (ref 70–125)
POTASSIUM BLD-SCNC: 4.5 MMOL/L (ref 3.5–5)
SODIUM SERPL-SCNC: 135 MMOL/L (ref 136–145)

## 2021-09-04 ENCOUNTER — HEALTH MAINTENANCE LETTER (OUTPATIENT)
Age: 76
End: 2021-09-04

## 2021-09-07 ENCOUNTER — LAB REQUISITION (OUTPATIENT)
Dept: LAB | Facility: CLINIC | Age: 76
End: 2021-09-07
Payer: MEDICARE

## 2021-09-07 DIAGNOSIS — I12.0 HYPERTENSIVE CHRONIC KIDNEY DISEASE WITH STAGE 5 CHRONIC KIDNEY DISEASE OR END STAGE RENAL DISEASE (H): ICD-10-CM

## 2021-09-07 DIAGNOSIS — E11.22 TYPE 2 DIABETES MELLITUS WITH DIABETIC CHRONIC KIDNEY DISEASE (H): ICD-10-CM

## 2021-09-09 LAB
ANION GAP SERPL CALCULATED.3IONS-SCNC: 10 MMOL/L (ref 5–18)
BUN SERPL-MCNC: 24 MG/DL (ref 8–28)
CALCIUM SERPL-MCNC: 10 MG/DL (ref 8.5–10.5)
CHLORIDE BLD-SCNC: 104 MMOL/L (ref 98–107)
CO2 SERPL-SCNC: 22 MMOL/L (ref 22–31)
CREAT SERPL-MCNC: 1.71 MG/DL (ref 0.6–1.1)
ERYTHROCYTE [DISTWIDTH] IN BLOOD BY AUTOMATED COUNT: 13.9 % (ref 10–15)
GFR SERPL CREATININE-BSD FRML MDRD: 29 ML/MIN/1.73M2
GLUCOSE BLD-MCNC: 204 MG/DL (ref 70–125)
HBA1C MFR BLD: 9.9 %
HCT VFR BLD AUTO: 37.6 % (ref 35–47)
HGB BLD-MCNC: 12.8 G/DL (ref 11.7–15.7)
MCH RBC QN AUTO: 28.1 PG (ref 26.5–33)
MCHC RBC AUTO-ENTMCNC: 34 G/DL (ref 31.5–36.5)
MCV RBC AUTO: 83 FL (ref 78–100)
PLATELET # BLD AUTO: 289 10E3/UL (ref 150–450)
POTASSIUM BLD-SCNC: 4.3 MMOL/L (ref 3.5–5)
RBC # BLD AUTO: 4.55 10E6/UL (ref 3.8–5.2)
SODIUM SERPL-SCNC: 136 MMOL/L (ref 136–145)
WBC # BLD AUTO: 9.7 10E3/UL (ref 4–11)

## 2021-09-09 PROCEDURE — 83036 HEMOGLOBIN GLYCOSYLATED A1C: CPT | Mod: ORL | Performed by: FAMILY MEDICINE

## 2021-09-09 PROCEDURE — 85027 COMPLETE CBC AUTOMATED: CPT | Mod: ORL | Performed by: FAMILY MEDICINE

## 2021-09-09 PROCEDURE — 36415 COLL VENOUS BLD VENIPUNCTURE: CPT | Mod: ORL | Performed by: FAMILY MEDICINE

## 2021-09-09 PROCEDURE — P9603 ONE-WAY ALLOW PRORATED MILES: HCPCS | Mod: ORL | Performed by: FAMILY MEDICINE

## 2021-09-09 PROCEDURE — 80048 BASIC METABOLIC PNL TOTAL CA: CPT | Mod: ORL | Performed by: FAMILY MEDICINE

## 2021-09-10 ENCOUNTER — LAB REQUISITION (OUTPATIENT)
Dept: LAB | Facility: CLINIC | Age: 76
End: 2021-09-10
Payer: COMMERCIAL

## 2021-09-10 DIAGNOSIS — E11.65 TYPE 2 DIABETES MELLITUS WITH HYPERGLYCEMIA (H): ICD-10-CM

## 2021-09-10 LAB
ALBUMIN UR-MCNC: NEGATIVE MG/DL
APPEARANCE UR: CLEAR
BACTERIA #/AREA URNS HPF: ABNORMAL /HPF
BILIRUB UR QL STRIP: NEGATIVE
COLOR UR AUTO: COLORLESS
CREAT UR-MCNC: 73 MG/DL
GLUCOSE UR STRIP-MCNC: NEGATIVE MG/DL
HGB UR QL STRIP: NEGATIVE
KETONES UR STRIP-MCNC: NEGATIVE MG/DL
LEUKOCYTE ESTERASE UR QL STRIP: ABNORMAL
MICROALBUMIN UR-MCNC: 6.51 MG/DL (ref 0–1.99)
MICROALBUMIN/CREAT UR: 89.2 MG/G CR
NITRATE UR QL: NEGATIVE
PH UR STRIP: 6 [PH] (ref 5–7)
RBC URINE: <1 /HPF
SP GR UR STRIP: 1.01 (ref 1–1.03)
SQUAMOUS EPITHELIAL: 2 /HPF
TRANSITIONAL EPI: 1 /HPF
UROBILINOGEN UR STRIP-MCNC: <2 MG/DL
WBC URINE: 15 /HPF

## 2021-09-10 PROCEDURE — 82043 UR ALBUMIN QUANTITATIVE: CPT | Mod: ORL | Performed by: FAMILY MEDICINE

## 2021-09-10 PROCEDURE — 81001 URINALYSIS AUTO W/SCOPE: CPT | Mod: ORL | Performed by: FAMILY MEDICINE

## 2021-09-13 ENCOUNTER — LAB REQUISITION (OUTPATIENT)
Dept: LAB | Facility: CLINIC | Age: 76
End: 2021-09-13
Payer: COMMERCIAL

## 2021-09-13 DIAGNOSIS — E11.9 TYPE 2 DIABETES MELLITUS WITHOUT COMPLICATIONS (H): ICD-10-CM

## 2021-09-13 DIAGNOSIS — E78.5 HYPERLIPIDEMIA, UNSPECIFIED: ICD-10-CM

## 2021-09-13 DIAGNOSIS — I10 ESSENTIAL (PRIMARY) HYPERTENSION: ICD-10-CM

## 2021-09-13 DIAGNOSIS — N18.9 CHRONIC KIDNEY DISEASE, UNSPECIFIED: ICD-10-CM

## 2021-09-14 LAB
ALBUMIN SERPL-MCNC: 3 G/DL (ref 3.5–5)
ALP SERPL-CCNC: 108 U/L (ref 45–120)
ALT SERPL W P-5'-P-CCNC: 23 U/L (ref 0–45)
ANION GAP SERPL CALCULATED.3IONS-SCNC: 8 MMOL/L (ref 5–18)
AST SERPL W P-5'-P-CCNC: 19 U/L (ref 0–40)
BILIRUB DIRECT SERPL-MCNC: 0.1 MG/DL
BILIRUB SERPL-MCNC: 0.3 MG/DL (ref 0–1)
BUN SERPL-MCNC: 26 MG/DL (ref 8–28)
CALCIUM SERPL-MCNC: 9.4 MG/DL (ref 8.5–10.5)
CHLORIDE BLD-SCNC: 104 MMOL/L (ref 98–107)
CO2 SERPL-SCNC: 24 MMOL/L (ref 22–31)
CREAT SERPL-MCNC: 1.43 MG/DL (ref 0.6–1.1)
GFR SERPL CREATININE-BSD FRML MDRD: 36 ML/MIN/1.73M2
GLUCOSE BLD-MCNC: 246 MG/DL (ref 70–125)
MAGNESIUM SERPL-MCNC: 1.7 MG/DL (ref 1.8–2.6)
PHOSPHATE SERPL-MCNC: 3 MG/DL (ref 2.5–4.5)
POTASSIUM BLD-SCNC: 4.3 MMOL/L (ref 3.5–5)
PROT SERPL-MCNC: 7.2 G/DL (ref 6–8)
SODIUM SERPL-SCNC: 136 MMOL/L (ref 136–145)
TSH SERPL DL<=0.005 MIU/L-ACNC: 1.83 UIU/ML (ref 0.3–5)

## 2021-09-14 PROCEDURE — 84100 ASSAY OF PHOSPHORUS: CPT | Mod: ORL | Performed by: FAMILY MEDICINE

## 2021-09-14 PROCEDURE — P9604 ONE-WAY ALLOW PRORATED TRIP: HCPCS | Mod: ORL | Performed by: FAMILY MEDICINE

## 2021-09-14 PROCEDURE — 80053 COMPREHEN METABOLIC PANEL: CPT | Mod: ORL | Performed by: FAMILY MEDICINE

## 2021-09-14 PROCEDURE — 82248 BILIRUBIN DIRECT: CPT | Mod: ORL | Performed by: FAMILY MEDICINE

## 2021-09-14 PROCEDURE — 83735 ASSAY OF MAGNESIUM: CPT | Mod: ORL | Performed by: FAMILY MEDICINE

## 2021-09-14 PROCEDURE — 84443 ASSAY THYROID STIM HORMONE: CPT | Mod: ORL | Performed by: FAMILY MEDICINE

## 2021-09-14 PROCEDURE — 36415 COLL VENOUS BLD VENIPUNCTURE: CPT | Mod: ORL | Performed by: FAMILY MEDICINE

## 2021-10-20 ENCOUNTER — LAB REQUISITION (OUTPATIENT)
Dept: LAB | Facility: CLINIC | Age: 76
End: 2021-10-20
Payer: MEDICARE

## 2021-10-20 DIAGNOSIS — I10 ESSENTIAL (PRIMARY) HYPERTENSION: ICD-10-CM

## 2021-10-21 LAB
ANION GAP SERPL CALCULATED.3IONS-SCNC: 8 MMOL/L (ref 5–18)
BUN SERPL-MCNC: 14 MG/DL (ref 8–28)
CALCIUM SERPL-MCNC: 10.1 MG/DL (ref 8.5–10.5)
CHLORIDE BLD-SCNC: 103 MMOL/L (ref 98–107)
CO2 SERPL-SCNC: 27 MMOL/L (ref 22–31)
CREAT SERPL-MCNC: 1.49 MG/DL (ref 0.6–1.1)
GFR SERPL CREATININE-BSD FRML MDRD: 34 ML/MIN/1.73M2
GLUCOSE BLD-MCNC: 66 MG/DL (ref 70–125)
POTASSIUM BLD-SCNC: 3.6 MMOL/L (ref 3.5–5)
SODIUM SERPL-SCNC: 138 MMOL/L (ref 136–145)

## 2021-10-21 PROCEDURE — 80048 BASIC METABOLIC PNL TOTAL CA: CPT | Mod: ORL | Performed by: INTERNAL MEDICINE

## 2021-10-21 PROCEDURE — P9604 ONE-WAY ALLOW PRORATED TRIP: HCPCS | Mod: ORL | Performed by: INTERNAL MEDICINE

## 2021-10-21 PROCEDURE — 36415 COLL VENOUS BLD VENIPUNCTURE: CPT | Mod: ORL | Performed by: INTERNAL MEDICINE

## 2021-11-05 ENCOUNTER — ANCILLARY PROCEDURE (OUTPATIENT)
Dept: MAMMOGRAPHY | Facility: CLINIC | Age: 76
End: 2021-11-05
Attending: INTERNAL MEDICINE
Payer: COMMERCIAL

## 2021-11-05 ENCOUNTER — ONCOLOGY VISIT (OUTPATIENT)
Dept: ONCOLOGY | Facility: CLINIC | Age: 76
End: 2021-11-05
Attending: INTERNAL MEDICINE
Payer: COMMERCIAL

## 2021-11-05 VITALS
TEMPERATURE: 98.1 F | DIASTOLIC BLOOD PRESSURE: 90 MMHG | SYSTOLIC BLOOD PRESSURE: 151 MMHG | HEART RATE: 95 BPM | OXYGEN SATURATION: 98 %

## 2021-11-05 DIAGNOSIS — Z79.811 USE OF AROMATASE INHIBITORS: ICD-10-CM

## 2021-11-05 DIAGNOSIS — C50.412 MALIGNANT NEOPLASM OF UPPER-OUTER QUADRANT OF LEFT BREAST IN FEMALE, ESTROGEN RECEPTOR POSITIVE (H): Primary | ICD-10-CM

## 2021-11-05 DIAGNOSIS — Z17.0 MALIGNANT NEOPLASM OF UPPER-OUTER QUADRANT OF LEFT BREAST IN FEMALE, ESTROGEN RECEPTOR POSITIVE (H): ICD-10-CM

## 2021-11-05 DIAGNOSIS — C50.412 MALIGNANT NEOPLASM OF UPPER-OUTER QUADRANT OF LEFT BREAST IN FEMALE, ESTROGEN RECEPTOR POSITIVE (H): ICD-10-CM

## 2021-11-05 DIAGNOSIS — Z17.0 MALIGNANT NEOPLASM OF UPPER-OUTER QUADRANT OF LEFT BREAST IN FEMALE, ESTROGEN RECEPTOR POSITIVE (H): Primary | ICD-10-CM

## 2021-11-05 PROCEDURE — 77063 BREAST TOMOSYNTHESIS BI: CPT | Mod: GC | Performed by: RADIOLOGY

## 2021-11-05 PROCEDURE — G0463 HOSPITAL OUTPT CLINIC VISIT: HCPCS

## 2021-11-05 PROCEDURE — 99214 OFFICE O/P EST MOD 30 MIN: CPT | Performed by: PHYSICIAN ASSISTANT

## 2021-11-05 PROCEDURE — 77067 SCR MAMMO BI INCL CAD: CPT | Mod: GC | Performed by: RADIOLOGY

## 2021-11-05 RX ORDER — AMLODIPINE BESYLATE 10 MG/1
TABLET ORAL
COMMUNITY
Start: 2021-11-03 | End: 2023-03-31

## 2021-11-05 RX ORDER — DULAGLUTIDE 1.5 MG/.5ML
INJECTION, SOLUTION SUBCUTANEOUS
COMMUNITY
Start: 2021-11-02 | End: 2023-03-31

## 2021-11-05 RX ORDER — HYDROCHLOROTHIAZIDE 25 MG/1
25 TABLET ORAL DAILY
COMMUNITY
Start: 2021-10-28

## 2021-11-05 RX ORDER — INSULIN ASPART 100 [IU]/ML
22 INJECTION, SOLUTION INTRAVENOUS; SUBCUTANEOUS 2 TIMES DAILY
COMMUNITY
Start: 2021-11-04

## 2021-11-05 ASSESSMENT — PAIN SCALES - GENERAL: PAINLEVEL: NO PAIN (0)

## 2021-11-05 NOTE — LETTER
11/5/2021         RE: Estephania Ruboi  [address] [place] [id number]        Dear Colleague,    Thank you for referring your patient, Estephania Rubio, to the [facility]. Please see a copy of my visit note below.    Reason for Visit: follow-up for breast cancer    Oncology HPI: Estephania is a 75 year old postmenopausal female with a history of an early stage breast cancer treated in 4/2017. She reports that she completed therapy in 2018, however we do not have copies of all of her records. She reports she was treated in [place] in [place], [place], where she had a screening mammogram and was found to have an abnormality. This was found to be an estrogen receptor-positive, HER2-negative breast cancer. She was treated with lumpectomy and adjuvant radiation therapy. She reports she did not require chemotherapy and was started on letrozole. She continued on letrozole until most recent visit with Dr Slater in 4/2021 due to multiple CVAs and possible correlation with letrozole with elevated BP and cholesterol. Of note, she had a screening mammogram in Sept 2020 that required a biopsy which was ultimately proven to be fat necrosis.    Interval history: Today Estephania feels well. She is living in a memory center which she has been in for 8 months. She is a bit isolated there, she is able to walk in the halls. She had a stroke in January and has no residual physical symptoms, only some memory concerns. They prepare her food for her, she says she has gained maybe 20 lbs. She has not felt any new lumps. Energy levels have been stable, good appetite. Denies recent infectious symptoms or sick contacts. No pain today. She is going to have a cognitive test in December to see if she is ready to leave the memory center, she is unsure where she would go next possibly back to [place]. Denies fevers, chills, sweats, nausea, vomiting, diarrhea, respiratory symptoms, or chest pain/ pressure.      At the end of the visit after this provider had to step away for an emergency elsewhere, Estephania had left the clinic. When reunited in lobby Estephania was frustrated as she stated she was supposed to be here to see a surgeon and have surgery for removal of cyst in her axilla. Upon further questioning, pt was adamant that was the purpose of this visit.       Current Outpatient Medications   Medication Sig Dispense Refill     amLODIPine (NORVASC) 10 MG tablet        aspirin (ASA) 81 MG chewable tablet Take 81 mg by mouth daily       diclofenac (VOLTAREN) 1 % topical gel        empagliflozin (JARDIANCE) 10 MG TABS tablet Take 10 mg by mouth daily       glipiZIDE (GLUCOTROL XL) 10 MG 24 hr tablet Take 10 mg by mouth       hydrochlorothiazide (HYDRODIURIL) 25 MG tablet        insulin glargine (BASAGLAR KWIKPEN) 100 UNIT/ML pen TAKE 55 UNITS DAILY       lisinopril (ZESTRIL) 10 MG tablet Take 10 mg by mouth daily       NOVOLOG FLEXPEN 100 UNIT/ML soln        rosuvastatin (CRESTOR) 10 MG tablet Take 10 mg by mouth daily       sitagliptin (JANUVIA) 100 MG tablet Take 100 mg by mouth daily       TRULICITY 1.5 MG/0.5ML pen           No Known Allergies      Exam:   Physical Exam  Constitutional:       General: She is not in acute distress.     Appearance: Normal appearance. She is normal weight. She is not ill-appearing or toxic-appearing.   HENT:      Head: Normocephalic.      Nose: No congestion or rhinorrhea.      Mouth/Throat:      Mouth: Mucous membranes are dry.   Cardiovascular:      Rate and Rhythm: Normal rate and regular rhythm.   Pulmonary:      Effort: Pulmonary effort is normal.      Breath sounds: Normal breath sounds.   Abdominal:      Palpations: Abdomen is soft.   Musculoskeletal:         General: Normal range of motion.      Cervical back: Normal range of motion.   Skin:     General: Skin is warm and dry.   Neurological:      General: No focal deficit present.      Mental Status: She is alert.      Comments: No  focal deficits however cognition seemed to get worse as visit went on, unsure though however unclear baseline   Psychiatric:         Mood and Affect: Mood normal.          Blood pressure (!) 151/90, pulse 95, temperature 98.1  F (36.7  C), temperature source Oral, SpO2 98 %.  Wt Readings from Last 4 Encounters:   04/12/21 87 kg (191 lb 11.2 oz)   09/14/20 88.9 kg (196 lb)       Labs: n/a    Imaging:   Screening mammogram 11/5/2021- results pending however preliminary read from radiologist was no concerning findings.     Impression/plan:   Estephania is a 75-year-old female, postmenopausal, with a history of a total abdominal hysterectomy, bilateral salpingo-oophorectomy and a breast cancer treated with a lumpectomy and radiation therapy, completing all therapy in 04/2017. She did not require chemotherapy.This was an estrogen receptor-positive, HER2-negative breast cancer.      Breast cancer  - She has been off her letrozole since April 2021- occasionally femara can increase bp and cholesterol and given recent CVA and multiple comorbidities will continue off  - Estephania did not stay for a full breast exam, she reports no new lumps and mammogram from today preliminary read is negative for acute findings  - Continue annual mammograms with follow up- has appt with Dr Slater upcoming April 2022     Chronic kidney disease, stage III disease.    - States she no longer has a nephrologiest at [facility], she gets all her care at her memory center      Diabetes.    - She is currently on multiple oral agents, managed by NP at her facility       Health care maintenance  - She reports she had a colonoscopy approximately 3 years ago.  She has been plugged in with primary care and is working to establish care now that she lives in the [facility].   - Isn't walking or getting as much exercise as she'd like     Dementia secondary to multiple CVAs  - Continue cardiology and neurology follow up  - Lives in memory care facility and will re-test  cognition in December for possible discharge from facility    Will ask RN care coordinator to reach out to Estephania's facility on Monday to assess whether or not Estephania still has questions about ?cyst removal surgery she thought she was having today.       Corine Julio [facility]  [address] [place] [id number]  [phone]      45 minutes spent on the date of the encounter doing chart review, review of test results, interpretation of tests, patient visit and documentation     I discussed with patient in the lobby that we were pulled away for a crisis and apologized for the delay.  She was unclear about the purpose for the visit today, she thought I was a surgeon and she was getting a cyst removed.  We discussed I am a medical oncology PA and this was her annual oncology follow up.  Given her confusion on the appointment, we called her long term care, left a VM with the local NP that cares for her.  Will have RN CC follow up next week.  The assessment and plan were mutually discussed and this note was edited to reflect my findings.  Jackie Ohara PA-C    Again, thank you for allowing me to participate in the care of your patient.        Sincerely,        Gloria Ohara PA-C

## 2021-11-05 NOTE — NURSING NOTE
"Oncology Rooming Note    November 5, 2021 2:20 PM   Estephania Rubio is a 76 year old female who presents for:    Chief Complaint   Patient presents with     Oncology Clinic Visit     breast cancer     Initial Vitals: BP (!) 151/90   Pulse 95   Temp 98.1  F (36.7  C) (Oral)   SpO2 98%  Estimated body mass index is 28.31 kg/m  as calculated from the following:    Height as of 2/2/21: 1.753 m (5' 9\").    Weight as of 4/12/21: 87 kg (191 lb 11.2 oz). There is no height or weight on file to calculate BSA.  No Pain (0) Comment: Data Unavailable   No LMP recorded. Patient has had a hysterectomy.  Allergies reviewed: Yes  Medications reviewed: Yes    Medications: Medication refills not needed today.  Pharmacy name entered into [facility]:    [id number] [place] [address]  WRITTEN PRESCRIPTION REQUESTED    Clinical concerns: none       Vijaya Alonso CMA            "

## 2021-11-05 NOTE — PROGRESS NOTES
Reason for Visit: follow-up for breast cancer    Oncology HPI: Estephania is a 75 year old postmenopausal female with a history of an early stage breast cancer treated in 4/2017. She reports that she completed therapy in 2018, however we do not have copies of all of her records. She reports she was treated in [place] in [place], [place], where she had a screening mammogram and was found to have an abnormality. This was found to be an estrogen receptor-positive, HER2-negative breast cancer. She was treated with lumpectomy and adjuvant radiation therapy. She reports she did not require chemotherapy and was started on letrozole. She continued on letrozole until most recent visit with Dr Slater in 4/2021 due to multiple CVAs and possible correlation with letrozole with elevated BP and cholesterol. Of note, she had a screening mammogram in Sept 2020 that required a biopsy which was ultimately proven to be fat necrosis.    Interval history: Today Estephania feels well. She is living in a memory center which she has been in for 8 months. She is a bit isolated there, she is able to walk in the halls. She had a stroke in January and has no residual physical symptoms, only some memory concerns. They prepare her food for her, she says she has gained maybe 20 lbs. She has not felt any new lumps. Energy levels have been stable, good appetite. Denies recent infectious symptoms or sick contacts. No pain today. She is going to have a cognitive test in December to see if she is ready to leave the memory center, she is unsure where she would go next possibly back to [place]. Denies fevers, chills, sweats, nausea, vomiting, diarrhea, respiratory symptoms, or chest pain/ pressure.     At the end of the visit after this provider had to step away for an emergency elsewhere, Estephania had left the clinic. When reunited in [facility] Estephania was frustrated as she stated she was supposed to be here to see a surgeon and have surgery for removal of cyst in  her axilla. Upon further questioning, pt was adamant that was the purpose of this visit.       Current Outpatient Medications   Medication Sig Dispense Refill     amLODIPine (NORVASC) 10 MG tablet        aspirin (ASA) 81 MG chewable tablet Take 81 mg by mouth daily       diclofenac (VOLTAREN) 1 % topical gel        empagliflozin (JARDIANCE) 10 MG TABS tablet Take 10 mg by mouth daily       glipiZIDE (GLUCOTROL XL) 10 MG 24 hr tablet Take 10 mg by mouth       hydrochlorothiazide (HYDRODIURIL) 25 MG tablet        insulin glargine (BASAGLAR KWIKPEN) 100 UNIT/ML pen TAKE 55 UNITS DAILY       lisinopril (ZESTRIL) 10 MG tablet Take 10 mg by mouth daily       NOVOLOG FLEXPEN 100 UNIT/ML soln        rosuvastatin (CRESTOR) 10 MG tablet Take 10 mg by mouth daily       sitagliptin (JANUVIA) 100 MG tablet Take 100 mg by mouth daily       TRULICITY 1.5 MG/0.5ML pen           No Known Allergies      Exam:   Physical Exam  Constitutional:       General: She is not in acute distress.     Appearance: Normal appearance. She is normal weight. She is not ill-appearing or toxic-appearing.   HENT:      Head: Normocephalic.      Nose: No congestion or rhinorrhea.      Mouth/Throat:      Mouth: Mucous membranes are dry.   Cardiovascular:      Rate and Rhythm: Normal rate and regular rhythm.   Pulmonary:      Effort: Pulmonary effort is normal.      Breath sounds: Normal breath sounds.   Abdominal:      Palpations: Abdomen is soft.   Musculoskeletal:         General: Normal range of motion.      Cervical back: Normal range of motion.   Skin:     General: Skin is warm and dry.   Neurological:      General: No focal deficit present.      Mental Status: She is alert.      Comments: No focal deficits however cognition seemed to get worse as visit went on, unsure though however unclear baseline   Psychiatric:         Mood and Affect: Mood normal.          Blood pressure (!) 151/90, pulse 95, temperature 98.1  F (36.7  C), temperature source  Oral, SpO2 98 %.  Wt Readings from Last 4 Encounters:   04/12/21 87 kg (191 lb 11.2 oz)   09/14/20 88.9 kg (196 lb)       Labs: n/a    Imaging:   Screening mammogram 11/5/2021- results pending however preliminary read from radiologist was no concerning findings.     Impression/plan:   Estephania is a 75-year-old female, postmenopausal, with a history of a total abdominal hysterectomy, bilateral salpingo-oophorectomy and a breast cancer treated with a lumpectomy and radiation therapy, completing all therapy in 04/2017. She did not require chemotherapy.This was an estrogen receptor-positive, HER2-negative breast cancer.      Breast cancer  - She has been off her letrozole since April 2021- occasionally femara can increase bp and cholesterol and given recent CVA and multiple comorbidities will continue off  - Estephania did not stay for a full breast exam, she reports no new lumps and mammogram from today preliminary read is negative for acute findings  - Continue annual mammograms with follow up- has appt with Dr Slater upcoming April 2022     Chronic kidney disease, stage III disease.    - States she no longer has a nephrologiest at [facility], she gets all her care at her memory center      Diabetes.    - She is currently on multiple oral agents, managed by NP at her facility       Health care maintenance  - She reports she had a colonoscopy approximately 3 years ago.  She has been plugged in with primary care and is working to establish care now that she lives in the [facility].   - Isn't walking or getting as much exercise as she'd like     Dementia secondary to multiple CVAs  - Continue cardiology and neurology follow up  - Lives in memory care facility and will re-test cognition in December for possible discharge from facility    Will ask RN care coordinator to reach out to Estephania's facility on Monday to assess whether or not Estephania still has questions about ?cyst removal surgery she thought she was having today.       Corine  Julio [facility]  [address] [place] [address]  [phone]      45 minutes spent on the date of the encounter doing chart review, review of test results, interpretation of tests, patient visit and documentation     I discussed with patient in the lobby that we were pulled away for a crisis and apologized for the delay.  She was unclear about the purpose for the visit today, she thought I was a surgeon and she was getting a cyst removed.  We discussed I am a medical oncology PA and this was her annual oncology follow up.  Given her confusion on the appointment, we called her long term care, left a VM with the local NP that cares for her.  Will have RN CC follow up next week.  The assessment and plan were mutually discussed and this note was edited to reflect my findings.  Jackie Ohara PA-C

## 2021-12-07 ENCOUNTER — LAB REQUISITION (OUTPATIENT)
Dept: LAB | Facility: CLINIC | Age: 76
End: 2021-12-07
Payer: MEDICARE

## 2021-12-07 DIAGNOSIS — E11.22 TYPE 2 DIABETES MELLITUS WITH DIABETIC CHRONIC KIDNEY DISEASE (H): ICD-10-CM

## 2021-12-07 DIAGNOSIS — I10 ESSENTIAL (PRIMARY) HYPERTENSION: ICD-10-CM

## 2021-12-07 LAB
ANION GAP SERPL CALCULATED.3IONS-SCNC: 10 MMOL/L (ref 5–18)
BUN SERPL-MCNC: 18 MG/DL (ref 8–28)
CALCIUM SERPL-MCNC: 9.8 MG/DL (ref 8.5–10.5)
CHLORIDE BLD-SCNC: 102 MMOL/L (ref 98–107)
CO2 SERPL-SCNC: 24 MMOL/L (ref 22–31)
CREAT SERPL-MCNC: 1.44 MG/DL (ref 0.6–1.1)
GFR SERPL CREATININE-BSD FRML MDRD: 35 ML/MIN/1.73M2
GLUCOSE BLD-MCNC: 151 MG/DL (ref 70–125)
HBA1C MFR BLD: 8.5 %
HGB BLD-MCNC: 12.2 G/DL (ref 11.7–15.7)
POTASSIUM BLD-SCNC: 3.6 MMOL/L (ref 3.5–5)
SODIUM SERPL-SCNC: 136 MMOL/L (ref 136–145)

## 2021-12-07 PROCEDURE — 85018 HEMOGLOBIN: CPT | Mod: ORL | Performed by: INTERNAL MEDICINE

## 2021-12-07 PROCEDURE — 80048 BASIC METABOLIC PNL TOTAL CA: CPT | Mod: ORL | Performed by: INTERNAL MEDICINE

## 2021-12-07 PROCEDURE — 83036 HEMOGLOBIN GLYCOSYLATED A1C: CPT | Mod: ORL | Performed by: INTERNAL MEDICINE

## 2021-12-07 PROCEDURE — P9604 ONE-WAY ALLOW PRORATED TRIP: HCPCS | Mod: ORL | Performed by: INTERNAL MEDICINE

## 2021-12-07 PROCEDURE — 36415 COLL VENOUS BLD VENIPUNCTURE: CPT | Mod: ORL | Performed by: INTERNAL MEDICINE

## 2022-01-31 ENCOUNTER — LAB REQUISITION (OUTPATIENT)
Dept: LAB | Facility: CLINIC | Age: 77
End: 2022-01-31
Payer: COMMERCIAL

## 2022-01-31 DIAGNOSIS — N18.30 CHRONIC KIDNEY DISEASE, STAGE 3 UNSPECIFIED (H): ICD-10-CM

## 2022-02-01 ENCOUNTER — LAB REQUISITION (OUTPATIENT)
Dept: LAB | Facility: CLINIC | Age: 77
End: 2022-02-01
Payer: MEDICARE

## 2022-02-01 DIAGNOSIS — N18.30 CHRONIC KIDNEY DISEASE, STAGE 3 UNSPECIFIED (H): ICD-10-CM

## 2022-02-01 PROCEDURE — 36415 COLL VENOUS BLD VENIPUNCTURE: CPT | Mod: ORL | Performed by: INTERNAL MEDICINE

## 2022-02-01 PROCEDURE — P9603 ONE-WAY ALLOW PRORATED MILES: HCPCS | Mod: ORL | Performed by: INTERNAL MEDICINE

## 2022-02-01 PROCEDURE — 80048 BASIC METABOLIC PNL TOTAL CA: CPT | Mod: ORL | Performed by: INTERNAL MEDICINE

## 2022-02-02 LAB
ANION GAP SERPL CALCULATED.3IONS-SCNC: 6 MMOL/L (ref 5–18)
BUN SERPL-MCNC: 18 MG/DL (ref 8–28)
CALCIUM SERPL-MCNC: 9.6 MG/DL (ref 8.5–10.5)
CHLORIDE BLD-SCNC: 105 MMOL/L (ref 98–107)
CO2 SERPL-SCNC: 24 MMOL/L (ref 22–31)
CREAT SERPL-MCNC: 1.63 MG/DL (ref 0.6–1.1)
GFR SERPL CREATININE-BSD FRML MDRD: 32 ML/MIN/1.73M2
GLUCOSE BLD-MCNC: 227 MG/DL (ref 70–125)
POTASSIUM BLD-SCNC: 4.5 MMOL/L (ref 3.5–5)
SODIUM SERPL-SCNC: 135 MMOL/L (ref 136–145)

## 2022-02-19 ENCOUNTER — HEALTH MAINTENANCE LETTER (OUTPATIENT)
Age: 77
End: 2022-02-19

## 2022-03-02 ENCOUNTER — LAB REQUISITION (OUTPATIENT)
Dept: LAB | Facility: CLINIC | Age: 77
End: 2022-03-02
Payer: COMMERCIAL

## 2022-03-02 DIAGNOSIS — I10 ESSENTIAL (PRIMARY) HYPERTENSION: ICD-10-CM

## 2022-03-03 LAB
ANION GAP SERPL CALCULATED.3IONS-SCNC: 8 MMOL/L (ref 5–18)
BUN SERPL-MCNC: 21 MG/DL (ref 8–28)
CALCIUM SERPL-MCNC: 9.5 MG/DL (ref 8.5–10.5)
CHLORIDE BLD-SCNC: 105 MMOL/L (ref 98–107)
CO2 SERPL-SCNC: 25 MMOL/L (ref 22–31)
CREAT SERPL-MCNC: 1.49 MG/DL (ref 0.6–1.1)
GFR SERPL CREATININE-BSD FRML MDRD: 36 ML/MIN/1.73M2
GLUCOSE BLD-MCNC: 165 MG/DL (ref 70–125)
POTASSIUM BLD-SCNC: 4.3 MMOL/L (ref 3.5–5)
SODIUM SERPL-SCNC: 138 MMOL/L (ref 136–145)

## 2022-03-03 PROCEDURE — 80048 BASIC METABOLIC PNL TOTAL CA: CPT | Mod: ORL | Performed by: INTERNAL MEDICINE

## 2022-03-03 PROCEDURE — 36415 COLL VENOUS BLD VENIPUNCTURE: CPT | Mod: ORL | Performed by: INTERNAL MEDICINE

## 2022-03-03 PROCEDURE — P9603 ONE-WAY ALLOW PRORATED MILES: HCPCS | Mod: ORL | Performed by: INTERNAL MEDICINE

## 2022-04-16 ENCOUNTER — HEALTH MAINTENANCE LETTER (OUTPATIENT)
Age: 77
End: 2022-04-16

## 2022-05-05 ENCOUNTER — LAB REQUISITION (OUTPATIENT)
Dept: LAB | Facility: CLINIC | Age: 77
End: 2022-05-05
Payer: COMMERCIAL

## 2022-05-05 DIAGNOSIS — I10 ESSENTIAL (PRIMARY) HYPERTENSION: ICD-10-CM

## 2022-05-05 DIAGNOSIS — E11.9 TYPE 2 DIABETES MELLITUS WITHOUT COMPLICATIONS (H): ICD-10-CM

## 2022-05-05 DIAGNOSIS — N18.9 CHRONIC KIDNEY DISEASE, UNSPECIFIED: ICD-10-CM

## 2022-05-05 DIAGNOSIS — E07.9 DISORDER OF THYROID, UNSPECIFIED: ICD-10-CM

## 2022-05-05 LAB
ALBUMIN MFR UR ELPH: 27.2 MG/DL
ANION GAP SERPL CALCULATED.3IONS-SCNC: 5 MMOL/L (ref 5–18)
BASOPHILS # BLD AUTO: 0 10E3/UL (ref 0–0.2)
BASOPHILS NFR BLD AUTO: 1 %
BNP SERPL-MCNC: <10 PG/ML (ref 0–144)
BUN SERPL-MCNC: 28 MG/DL (ref 8–28)
CALCIUM SERPL-MCNC: 9.5 MG/DL (ref 8.5–10.5)
CHLORIDE BLD-SCNC: 106 MMOL/L (ref 98–107)
CO2 SERPL-SCNC: 27 MMOL/L (ref 22–31)
CREAT SERPL-MCNC: 1.73 MG/DL (ref 0.6–1.1)
CREAT UR-MCNC: 68 MG/DL
CREAT UR-MCNC: 74 MG/DL
EOSINOPHIL # BLD AUTO: 0.1 10E3/UL (ref 0–0.7)
EOSINOPHIL NFR BLD AUTO: 1 %
ERYTHROCYTE [DISTWIDTH] IN BLOOD BY AUTOMATED COUNT: 15 % (ref 10–15)
GFR SERPL CREATININE-BSD FRML MDRD: 30 ML/MIN/1.73M2
GLUCOSE BLD-MCNC: 276 MG/DL (ref 70–125)
HBA1C MFR BLD: 8.6 %
HCT VFR BLD AUTO: 36.4 % (ref 35–47)
HGB BLD-MCNC: 11.9 G/DL (ref 11.7–15.7)
IMM GRANULOCYTES # BLD: 0 10E3/UL
IMM GRANULOCYTES NFR BLD: 0 %
LYMPHOCYTES # BLD AUTO: 2 10E3/UL (ref 0.8–5.3)
LYMPHOCYTES NFR BLD AUTO: 24 %
MCH RBC QN AUTO: 26.7 PG (ref 26.5–33)
MCHC RBC AUTO-ENTMCNC: 32.7 G/DL (ref 31.5–36.5)
MCV RBC AUTO: 82 FL (ref 78–100)
MICROALBUMIN UR-MCNC: 15.29 MG/DL (ref 0–1.99)
MICROALBUMIN/CREAT UR: 224.9 MG/G CR
MONOCYTES # BLD AUTO: 0.8 10E3/UL (ref 0–1.3)
MONOCYTES NFR BLD AUTO: 9 %
NEUTROPHILS # BLD AUTO: 5.4 10E3/UL (ref 1.6–8.3)
NEUTROPHILS NFR BLD AUTO: 65 %
NRBC # BLD AUTO: 0 10E3/UL
NRBC BLD AUTO-RTO: 0 /100
PLATELET # BLD AUTO: 271 10E3/UL (ref 150–450)
POTASSIUM BLD-SCNC: 4.1 MMOL/L (ref 3.5–5)
PROT/CREAT 24H UR: 0.37 MG/MG CR
RBC # BLD AUTO: 4.46 10E6/UL (ref 3.8–5.2)
SODIUM SERPL-SCNC: 138 MMOL/L (ref 136–145)
TSH SERPL DL<=0.005 MIU/L-ACNC: 1.13 UIU/ML (ref 0.3–5)
WBC # BLD AUTO: 8.3 10E3/UL (ref 4–11)

## 2022-05-05 PROCEDURE — P9603 ONE-WAY ALLOW PRORATED MILES: HCPCS | Mod: ORL | Performed by: INTERNAL MEDICINE

## 2022-05-05 PROCEDURE — 83036 HEMOGLOBIN GLYCOSYLATED A1C: CPT | Mod: ORL | Performed by: INTERNAL MEDICINE

## 2022-05-05 PROCEDURE — 84156 ASSAY OF PROTEIN URINE: CPT | Mod: ORL | Performed by: INTERNAL MEDICINE

## 2022-05-05 PROCEDURE — 85025 COMPLETE CBC W/AUTO DIFF WBC: CPT | Mod: ORL | Performed by: INTERNAL MEDICINE

## 2022-05-05 PROCEDURE — 80048 BASIC METABOLIC PNL TOTAL CA: CPT | Mod: ORL | Performed by: INTERNAL MEDICINE

## 2022-05-05 PROCEDURE — 82043 UR ALBUMIN QUANTITATIVE: CPT | Mod: ORL | Performed by: INTERNAL MEDICINE

## 2022-05-05 PROCEDURE — 83880 ASSAY OF NATRIURETIC PEPTIDE: CPT | Mod: ORL | Performed by: INTERNAL MEDICINE

## 2022-05-05 PROCEDURE — 36415 COLL VENOUS BLD VENIPUNCTURE: CPT | Mod: ORL | Performed by: INTERNAL MEDICINE

## 2022-05-05 PROCEDURE — 84443 ASSAY THYROID STIM HORMONE: CPT | Mod: ORL | Performed by: INTERNAL MEDICINE

## 2022-06-08 ENCOUNTER — LAB REQUISITION (OUTPATIENT)
Dept: LAB | Facility: CLINIC | Age: 77
End: 2022-06-08
Payer: COMMERCIAL

## 2022-06-08 DIAGNOSIS — I10 ESSENTIAL (PRIMARY) HYPERTENSION: ICD-10-CM

## 2022-06-09 LAB
ANION GAP SERPL CALCULATED.3IONS-SCNC: 8 MMOL/L (ref 5–18)
BUN SERPL-MCNC: 27 MG/DL (ref 8–28)
CALCIUM SERPL-MCNC: 9.4 MG/DL (ref 8.5–10.5)
CHLORIDE BLD-SCNC: 106 MMOL/L (ref 98–107)
CO2 SERPL-SCNC: 24 MMOL/L (ref 22–31)
CREAT SERPL-MCNC: 1.51 MG/DL (ref 0.6–1.1)
GFR SERPL CREATININE-BSD FRML MDRD: 35 ML/MIN/1.73M2
GLUCOSE BLD-MCNC: 160 MG/DL (ref 70–125)
POTASSIUM BLD-SCNC: 4.1 MMOL/L (ref 3.5–5)
SODIUM SERPL-SCNC: 138 MMOL/L (ref 136–145)

## 2022-06-09 PROCEDURE — P9604 ONE-WAY ALLOW PRORATED TRIP: HCPCS | Mod: ORL | Performed by: INTERNAL MEDICINE

## 2022-06-09 PROCEDURE — 80048 BASIC METABOLIC PNL TOTAL CA: CPT | Mod: ORL | Performed by: INTERNAL MEDICINE

## 2022-06-09 PROCEDURE — 36415 COLL VENOUS BLD VENIPUNCTURE: CPT | Mod: ORL | Performed by: INTERNAL MEDICINE

## 2022-08-06 ENCOUNTER — HEALTH MAINTENANCE LETTER (OUTPATIENT)
Age: 77
End: 2022-08-06

## 2022-09-11 ENCOUNTER — LAB REQUISITION (OUTPATIENT)
Dept: LAB | Facility: CLINIC | Age: 77
End: 2022-09-11
Payer: COMMERCIAL

## 2022-09-11 DIAGNOSIS — N18.30 CHRONIC KIDNEY DISEASE, STAGE 3 UNSPECIFIED (H): ICD-10-CM

## 2022-09-11 DIAGNOSIS — E11.22 TYPE 2 DIABETES MELLITUS WITH DIABETIC CHRONIC KIDNEY DISEASE (H): ICD-10-CM

## 2022-09-11 DIAGNOSIS — E78.5 HYPERLIPIDEMIA, UNSPECIFIED: ICD-10-CM

## 2022-09-13 LAB
ALBUMIN SERPL BCG-MCNC: 3.4 G/DL (ref 3.5–5.2)
ALP SERPL-CCNC: 103 U/L (ref 35–104)
ALT SERPL W P-5'-P-CCNC: 49 U/L (ref 10–35)
ANION GAP SERPL CALCULATED.3IONS-SCNC: 6 MMOL/L (ref 7–15)
AST SERPL W P-5'-P-CCNC: 41 U/L (ref 10–35)
BILIRUB DIRECT SERPL-MCNC: <0.2 MG/DL (ref 0–0.3)
BILIRUB SERPL-MCNC: 0.3 MG/DL
BUN SERPL-MCNC: 32.6 MG/DL (ref 8–23)
CALCIUM SERPL-MCNC: 9.3 MG/DL (ref 8.8–10.2)
CHLORIDE SERPL-SCNC: 108 MMOL/L (ref 98–107)
CREAT SERPL-MCNC: 1.77 MG/DL (ref 0.51–0.95)
DEPRECATED HCO3 PLAS-SCNC: 24 MMOL/L (ref 22–29)
ERYTHROCYTE [DISTWIDTH] IN BLOOD BY AUTOMATED COUNT: 15.3 % (ref 10–15)
GFR SERPL CREATININE-BSD FRML MDRD: 29 ML/MIN/1.73M2
GLUCOSE SERPL-MCNC: 122 MG/DL (ref 70–99)
HBA1C MFR BLD: 8.4 %
HCT VFR BLD AUTO: 35.1 % (ref 35–47)
HGB BLD-MCNC: 11.6 G/DL (ref 11.7–15.7)
MCH RBC QN AUTO: 27.2 PG (ref 26.5–33)
MCHC RBC AUTO-ENTMCNC: 33 G/DL (ref 31.5–36.5)
MCV RBC AUTO: 82 FL (ref 78–100)
PLATELET # BLD AUTO: 288 10E3/UL (ref 150–450)
POTASSIUM SERPL-SCNC: 4.3 MMOL/L (ref 3.4–5.3)
PROT SERPL-MCNC: 7.6 G/DL (ref 6.4–8.3)
RBC # BLD AUTO: 4.27 10E6/UL (ref 3.8–5.2)
SODIUM SERPL-SCNC: 138 MMOL/L (ref 136–145)
TSH SERPL DL<=0.005 MIU/L-ACNC: 2.42 UIU/ML (ref 0.3–4.2)
WBC # BLD AUTO: 8.6 10E3/UL (ref 4–11)

## 2022-09-13 PROCEDURE — 83036 HEMOGLOBIN GLYCOSYLATED A1C: CPT | Mod: ORL | Performed by: INTERNAL MEDICINE

## 2022-09-13 PROCEDURE — 36415 COLL VENOUS BLD VENIPUNCTURE: CPT | Mod: ORL | Performed by: INTERNAL MEDICINE

## 2022-09-13 PROCEDURE — 84443 ASSAY THYROID STIM HORMONE: CPT | Mod: ORL | Performed by: INTERNAL MEDICINE

## 2022-09-13 PROCEDURE — 85027 COMPLETE CBC AUTOMATED: CPT | Mod: ORL | Performed by: INTERNAL MEDICINE

## 2022-09-13 PROCEDURE — P9604 ONE-WAY ALLOW PRORATED TRIP: HCPCS | Mod: ORL | Performed by: INTERNAL MEDICINE

## 2022-09-13 PROCEDURE — 82248 BILIRUBIN DIRECT: CPT | Mod: ORL | Performed by: INTERNAL MEDICINE

## 2022-09-20 ENCOUNTER — LAB REQUISITION (OUTPATIENT)
Dept: LAB | Facility: CLINIC | Age: 77
End: 2022-09-20
Payer: COMMERCIAL

## 2022-09-20 DIAGNOSIS — I10 ESSENTIAL (PRIMARY) HYPERTENSION: ICD-10-CM

## 2022-09-22 LAB
ANION GAP SERPL CALCULATED.3IONS-SCNC: 9 MMOL/L (ref 7–15)
BUN SERPL-MCNC: 30.2 MG/DL (ref 8–23)
CALCIUM SERPL-MCNC: 9.2 MG/DL (ref 8.8–10.2)
CHLORIDE SERPL-SCNC: 105 MMOL/L (ref 98–107)
CREAT SERPL-MCNC: 1.58 MG/DL (ref 0.51–0.95)
DEPRECATED HCO3 PLAS-SCNC: 22 MMOL/L (ref 22–29)
GFR SERPL CREATININE-BSD FRML MDRD: 33 ML/MIN/1.73M2
GLUCOSE SERPL-MCNC: 156 MG/DL (ref 70–99)
POTASSIUM SERPL-SCNC: 4.5 MMOL/L (ref 3.4–5.3)
SODIUM SERPL-SCNC: 136 MMOL/L (ref 136–145)

## 2022-09-22 PROCEDURE — P9604 ONE-WAY ALLOW PRORATED TRIP: HCPCS | Mod: ORL | Performed by: NURSE PRACTITIONER

## 2022-09-22 PROCEDURE — 80048 BASIC METABOLIC PNL TOTAL CA: CPT | Mod: ORL | Performed by: NURSE PRACTITIONER

## 2022-09-22 PROCEDURE — 36415 COLL VENOUS BLD VENIPUNCTURE: CPT | Mod: ORL | Performed by: NURSE PRACTITIONER

## 2022-09-25 ENCOUNTER — LAB REQUISITION (OUTPATIENT)
Dept: LAB | Facility: CLINIC | Age: 77
End: 2022-09-25
Payer: COMMERCIAL

## 2022-09-25 DIAGNOSIS — I10 ESSENTIAL (PRIMARY) HYPERTENSION: ICD-10-CM

## 2022-09-27 LAB
ANION GAP SERPL CALCULATED.3IONS-SCNC: 8 MMOL/L (ref 7–15)
BUN SERPL-MCNC: 24.7 MG/DL (ref 8–23)
CALCIUM SERPL-MCNC: 9.4 MG/DL (ref 8.8–10.2)
CHLORIDE SERPL-SCNC: 103 MMOL/L (ref 98–107)
CREAT SERPL-MCNC: 1.73 MG/DL (ref 0.51–0.95)
DEPRECATED HCO3 PLAS-SCNC: 24 MMOL/L (ref 22–29)
GFR SERPL CREATININE-BSD FRML MDRD: 30 ML/MIN/1.73M2
GLUCOSE SERPL-MCNC: 162 MG/DL (ref 70–99)
HOLD SPECIMEN: NORMAL
POTASSIUM SERPL-SCNC: 4.3 MMOL/L (ref 3.4–5.3)
SODIUM SERPL-SCNC: 135 MMOL/L (ref 136–145)

## 2022-09-27 PROCEDURE — 36415 COLL VENOUS BLD VENIPUNCTURE: CPT | Mod: ORL | Performed by: NURSE PRACTITIONER

## 2022-09-27 PROCEDURE — 80048 BASIC METABOLIC PNL TOTAL CA: CPT | Mod: ORL | Performed by: NURSE PRACTITIONER

## 2022-09-27 PROCEDURE — P9604 ONE-WAY ALLOW PRORATED TRIP: HCPCS | Mod: ORL | Performed by: NURSE PRACTITIONER

## 2022-10-11 PROCEDURE — 82043 UR ALBUMIN QUANTITATIVE: CPT | Mod: ORL | Performed by: INTERNAL MEDICINE

## 2022-10-12 ENCOUNTER — LAB REQUISITION (OUTPATIENT)
Dept: LAB | Facility: CLINIC | Age: 77
End: 2022-10-12
Payer: COMMERCIAL

## 2022-10-12 DIAGNOSIS — E11.9 TYPE 2 DIABETES MELLITUS WITHOUT COMPLICATIONS (H): ICD-10-CM

## 2022-10-12 LAB
CREAT UR-MCNC: 82.7 MG/DL
MICROALBUMIN UR-MCNC: 188 MG/L
MICROALBUMIN/CREAT UR: 227.33 MG/G CR (ref 0–25)

## 2022-10-22 ENCOUNTER — HEALTH MAINTENANCE LETTER (OUTPATIENT)
Age: 77
End: 2022-10-22

## 2022-11-12 ENCOUNTER — LAB REQUISITION (OUTPATIENT)
Dept: LAB | Facility: CLINIC | Age: 77
End: 2022-11-12
Payer: COMMERCIAL

## 2022-11-12 DIAGNOSIS — E11.9 TYPE 2 DIABETES MELLITUS WITHOUT COMPLICATIONS (H): ICD-10-CM

## 2022-11-15 LAB
ANION GAP SERPL CALCULATED.3IONS-SCNC: 8 MMOL/L (ref 7–15)
BUN SERPL-MCNC: 26 MG/DL (ref 8–23)
CALCIUM SERPL-MCNC: 8.8 MG/DL (ref 8.8–10.2)
CHLORIDE SERPL-SCNC: 107 MMOL/L (ref 98–107)
CREAT SERPL-MCNC: 1.67 MG/DL (ref 0.51–0.95)
DEPRECATED CALCIDIOL+CALCIFEROL SERPL-MC: 14 UG/L (ref 20–75)
DEPRECATED HCO3 PLAS-SCNC: 23 MMOL/L (ref 22–29)
GFR SERPL CREATININE-BSD FRML MDRD: 31 ML/MIN/1.73M2
GLUCOSE SERPL-MCNC: 245 MG/DL (ref 70–99)
HBA1C MFR BLD: 8 %
MAGNESIUM SERPL-MCNC: 2.1 MG/DL (ref 1.7–2.3)
PHOSPHATE SERPL-MCNC: 2.5 MG/DL (ref 2.5–4.5)
POTASSIUM SERPL-SCNC: 5.1 MMOL/L (ref 3.4–5.3)
PTH-INTACT SERPL-MCNC: 124 PG/ML (ref 15–65)
SODIUM SERPL-SCNC: 138 MMOL/L (ref 136–145)

## 2022-11-15 PROCEDURE — 80048 BASIC METABOLIC PNL TOTAL CA: CPT | Mod: ORL | Performed by: INTERNAL MEDICINE

## 2022-11-15 PROCEDURE — P9603 ONE-WAY ALLOW PRORATED MILES: HCPCS | Mod: ORL | Performed by: INTERNAL MEDICINE

## 2022-11-15 PROCEDURE — 83036 HEMOGLOBIN GLYCOSYLATED A1C: CPT | Mod: ORL | Performed by: INTERNAL MEDICINE

## 2022-11-15 PROCEDURE — 36415 COLL VENOUS BLD VENIPUNCTURE: CPT | Mod: ORL | Performed by: INTERNAL MEDICINE

## 2022-11-15 PROCEDURE — 83735 ASSAY OF MAGNESIUM: CPT | Mod: ORL | Performed by: INTERNAL MEDICINE

## 2022-11-15 PROCEDURE — 82306 VITAMIN D 25 HYDROXY: CPT | Mod: ORL | Performed by: INTERNAL MEDICINE

## 2022-11-15 PROCEDURE — 83970 ASSAY OF PARATHORMONE: CPT | Mod: ORL | Performed by: INTERNAL MEDICINE

## 2022-11-15 PROCEDURE — 84100 ASSAY OF PHOSPHORUS: CPT | Mod: ORL | Performed by: INTERNAL MEDICINE

## 2022-11-25 ENCOUNTER — LAB REQUISITION (OUTPATIENT)
Dept: LAB | Facility: CLINIC | Age: 77
End: 2022-11-25
Payer: MEDICARE

## 2022-11-25 DIAGNOSIS — R05.1 ACUTE COUGH: ICD-10-CM

## 2022-11-26 ENCOUNTER — LAB REQUISITION (OUTPATIENT)
Dept: LAB | Facility: CLINIC | Age: 77
End: 2022-11-26
Payer: COMMERCIAL

## 2022-11-26 DIAGNOSIS — R05.1 ACUTE COUGH: ICD-10-CM

## 2022-11-26 DIAGNOSIS — J02.9 ACUTE PHARYNGITIS, UNSPECIFIED: ICD-10-CM

## 2022-11-26 LAB
FLUAV RNA SPEC QL NAA+PROBE: POSITIVE
FLUBV RNA RESP QL NAA+PROBE: NEGATIVE
RSV RNA SPEC NAA+PROBE: NEGATIVE
SARS-COV-2 RNA RESP QL NAA+PROBE: NEGATIVE

## 2022-11-26 PROCEDURE — 87637 SARSCOV2&INF A&B&RSV AMP PRB: CPT | Mod: ORL | Performed by: NURSE PRACTITIONER

## 2022-12-29 ENCOUNTER — LAB REQUISITION (OUTPATIENT)
Dept: LAB | Facility: CLINIC | Age: 77
End: 2022-12-29
Payer: COMMERCIAL

## 2022-12-29 DIAGNOSIS — I10 ESSENTIAL (PRIMARY) HYPERTENSION: ICD-10-CM

## 2023-01-03 LAB
ANION GAP SERPL CALCULATED.3IONS-SCNC: 12 MMOL/L (ref 7–15)
BUN SERPL-MCNC: 34.7 MG/DL (ref 8–23)
CALCIUM SERPL-MCNC: 9.6 MG/DL (ref 8.8–10.2)
CHLORIDE SERPL-SCNC: 110 MMOL/L (ref 98–107)
CREAT SERPL-MCNC: 1.89 MG/DL (ref 0.51–0.95)
DEPRECATED HCO3 PLAS-SCNC: 17 MMOL/L (ref 22–29)
GFR SERPL CREATININE-BSD FRML MDRD: 27 ML/MIN/1.73M2
GLUCOSE SERPL-MCNC: 138 MG/DL (ref 70–99)
HBA1C MFR BLD: 8.5 %
POTASSIUM SERPL-SCNC: 4.8 MMOL/L (ref 3.4–5.3)
SODIUM SERPL-SCNC: 139 MMOL/L (ref 136–145)

## 2023-01-03 PROCEDURE — 80048 BASIC METABOLIC PNL TOTAL CA: CPT | Mod: ORL | Performed by: INTERNAL MEDICINE

## 2023-01-03 PROCEDURE — P9604 ONE-WAY ALLOW PRORATED TRIP: HCPCS | Mod: ORL | Performed by: INTERNAL MEDICINE

## 2023-01-03 PROCEDURE — 36415 COLL VENOUS BLD VENIPUNCTURE: CPT | Mod: ORL | Performed by: INTERNAL MEDICINE

## 2023-01-03 PROCEDURE — 83036 HEMOGLOBIN GLYCOSYLATED A1C: CPT | Mod: ORL | Performed by: INTERNAL MEDICINE

## 2023-02-10 ENCOUNTER — TRANSCRIBE ORDERS (OUTPATIENT)
Dept: OTHER | Age: 78
End: 2023-02-10

## 2023-02-10 ENCOUNTER — PRE VISIT (OUTPATIENT)
Dept: ONCOLOGY | Facility: CLINIC | Age: 78
End: 2023-02-10
Payer: COMMERCIAL

## 2023-02-10 ENCOUNTER — PATIENT OUTREACH (OUTPATIENT)
Dept: ONCOLOGY | Facility: CLINIC | Age: 78
End: 2023-02-10
Payer: COMMERCIAL

## 2023-02-10 ENCOUNTER — LAB REQUISITION (OUTPATIENT)
Dept: LAB | Facility: CLINIC | Age: 78
End: 2023-02-10
Payer: COMMERCIAL

## 2023-02-10 DIAGNOSIS — N18.9 CHRONIC KIDNEY DISEASE, UNSPECIFIED: ICD-10-CM

## 2023-02-10 DIAGNOSIS — D47.2 MGUS (MONOCLONAL GAMMOPATHY OF UNKNOWN SIGNIFICANCE): Primary | ICD-10-CM

## 2023-02-10 NOTE — PROGRESS NOTES
February 10, 2023    Hematology/Oncology  referral reviewed.   Referred By  Referred To   Generic External Data Department    Diagnoses: MGUS (monoclonal gammopathy of unknown significance)   Order: Adult Oncology/Hematology  Referral    Hematology     Comment: MGUS / records at [facility], seen by Dr. Navas / self-referral / referral received via phone / transferred to records      T [phone]       CE updated    Pertinent labs --  In [facility] notable for K/L ratio = 3.68, high IgG and abnormal SPEP    Referring provider visit note -- BOOKMARKED  [facility]   [facility]    OUTGOING CALL to pt's daughter Kaela.  Introduced my role as nurse navigator with [facility] Hematology/Oncology dept and that we have recd the referral to hem/onc. Pt's daughter confirms that she is the one to speak to in regards to her mother's medical appts, prefers hem/onc consult within our system, warm-transferred Kaela to our  to schedule consult at location of their preference: [facility]  Future Appointments   Date Time Provider Department Center   2/17/2023  2:00 PM Cleopatra Schwab MD [facility]     Josephine Alejandre, RN, BSN, OCN  [facility] Hematology/Oncology Nurse Navigator  [phone]

## 2023-02-10 NOTE — PROGRESS NOTES
Action    Action Taken 2/10/2023 10:59AM KEB    Warm Transfer from Scheduling     I spoke to Estephania's daughter named Kaela. She had breast cancer in [facility]. It looks like she was seen at [facility] in [facility].  All of her outside records are in Epic/CE already     I sent an ib-message to the nurses to let them know we have Estephania's recent labs in [facility]. She needs a hematology visit at [facility]. Her daughter Kaela is Estephania's legal guardian (power of ).

## 2023-02-11 LAB
ANION GAP SERPL CALCULATED.3IONS-SCNC: 8 MMOL/L (ref 7–15)
BUN SERPL-MCNC: 29.3 MG/DL (ref 8–23)
CALCIUM SERPL-MCNC: 9.6 MG/DL (ref 8.8–10.2)
CHLORIDE SERPL-SCNC: 104 MMOL/L (ref 98–107)
CREAT SERPL-MCNC: 1.78 MG/DL (ref 0.51–0.95)
DEPRECATED HCO3 PLAS-SCNC: 25 MMOL/L (ref 22–29)
GFR SERPL CREATININE-BSD FRML MDRD: 29 ML/MIN/1.73M2
GLUCOSE SERPL-MCNC: 340 MG/DL (ref 70–99)
POTASSIUM SERPL-SCNC: 5 MMOL/L (ref 3.4–5.3)
SODIUM SERPL-SCNC: 137 MMOL/L (ref 136–145)

## 2023-02-11 PROCEDURE — P9603 ONE-WAY ALLOW PRORATED MILES: HCPCS | Mod: ORL | Performed by: INTERNAL MEDICINE

## 2023-02-11 PROCEDURE — 80048 BASIC METABOLIC PNL TOTAL CA: CPT | Mod: ORL | Performed by: INTERNAL MEDICINE

## 2023-02-11 PROCEDURE — 36415 COLL VENOUS BLD VENIPUNCTURE: CPT | Mod: ORL | Performed by: INTERNAL MEDICINE

## 2023-02-17 ENCOUNTER — LAB (OUTPATIENT)
Dept: INFUSION THERAPY | Facility: CLINIC | Age: 78
End: 2023-02-17
Attending: INTERNAL MEDICINE
Payer: COMMERCIAL

## 2023-02-17 ENCOUNTER — ONCOLOGY VISIT (OUTPATIENT)
Dept: ONCOLOGY | Facility: CLINIC | Age: 78
End: 2023-02-17
Attending: INTERNAL MEDICINE
Payer: COMMERCIAL

## 2023-02-17 VITALS
DIASTOLIC BLOOD PRESSURE: 74 MMHG | HEIGHT: 69 IN | BODY MASS INDEX: 30.36 KG/M2 | HEART RATE: 85 BPM | SYSTOLIC BLOOD PRESSURE: 132 MMHG | RESPIRATION RATE: 16 BRPM | OXYGEN SATURATION: 96 % | WEIGHT: 205 LBS

## 2023-02-17 DIAGNOSIS — D47.2 MGUS (MONOCLONAL GAMMOPATHY OF UNKNOWN SIGNIFICANCE): Primary | ICD-10-CM

## 2023-02-17 LAB
ALBUMIN SERPL-MCNC: 3.6 G/DL (ref 3.5–5)
ALP SERPL-CCNC: 137 U/L (ref 45–120)
ALT SERPL W P-5'-P-CCNC: 97 U/L (ref 0–45)
ANION GAP SERPL CALCULATED.3IONS-SCNC: 9 MMOL/L (ref 5–18)
AST SERPL W P-5'-P-CCNC: 79 U/L (ref 0–40)
BASOPHILS # BLD AUTO: 0.1 10E3/UL (ref 0–0.2)
BASOPHILS NFR BLD AUTO: 1 %
BILIRUB SERPL-MCNC: 0.3 MG/DL (ref 0–1)
BUN SERPL-MCNC: 22 MG/DL (ref 8–28)
CALCIUM SERPL-MCNC: 10.2 MG/DL (ref 8.5–10.5)
CHLORIDE BLD-SCNC: 105 MMOL/L (ref 98–107)
CO2 SERPL-SCNC: 25 MMOL/L (ref 22–31)
CREAT SERPL-MCNC: 1.93 MG/DL (ref 0.6–1.1)
EOSINOPHIL # BLD AUTO: 0.1 10E3/UL (ref 0–0.7)
EOSINOPHIL NFR BLD AUTO: 1 %
ERYTHROCYTE [DISTWIDTH] IN BLOOD BY AUTOMATED COUNT: 14.7 % (ref 10–15)
GFR SERPL CREATININE-BSD FRML MDRD: 26 ML/MIN/1.73M2
GLUCOSE BLD-MCNC: 152 MG/DL (ref 70–125)
HCT VFR BLD AUTO: 39.1 % (ref 35–47)
HGB BLD-MCNC: 13 G/DL (ref 11.7–15.7)
IMM GRANULOCYTES # BLD: 0 10E3/UL
IMM GRANULOCYTES NFR BLD: 0 %
LDH SERPL L TO P-CCNC: 241 U/L (ref 125–220)
LYMPHOCYTES # BLD AUTO: 2.2 10E3/UL (ref 0.8–5.3)
LYMPHOCYTES NFR BLD AUTO: 21 %
MCH RBC QN AUTO: 27.3 PG (ref 26.5–33)
MCHC RBC AUTO-ENTMCNC: 33.2 G/DL (ref 31.5–36.5)
MCV RBC AUTO: 82 FL (ref 78–100)
MONOCYTES # BLD AUTO: 1 10E3/UL (ref 0–1.3)
MONOCYTES NFR BLD AUTO: 9 %
NEUTROPHILS # BLD AUTO: 7.2 10E3/UL (ref 1.6–8.3)
NEUTROPHILS NFR BLD AUTO: 68 %
NRBC # BLD AUTO: 0 10E3/UL
NRBC BLD AUTO-RTO: 0 /100
PLATELET # BLD AUTO: 301 10E3/UL (ref 150–450)
POTASSIUM BLD-SCNC: 4.6 MMOL/L (ref 3.5–5)
PROT SERPL-MCNC: 9.4 G/DL (ref 6–8)
RBC # BLD AUTO: 4.76 10E6/UL (ref 3.8–5.2)
SODIUM SERPL-SCNC: 139 MMOL/L (ref 136–145)
TOTAL PROTEIN SERUM FOR ELP: 8.8 G/DL (ref 6.4–8.3)
WBC # BLD AUTO: 10.5 10E3/UL (ref 4–11)

## 2023-02-17 PROCEDURE — 99212 OFFICE O/P EST SF 10 MIN: CPT | Performed by: INTERNAL MEDICINE

## 2023-02-17 PROCEDURE — 80053 COMPREHEN METABOLIC PANEL: CPT | Performed by: INTERNAL MEDICINE

## 2023-02-17 PROCEDURE — 36415 COLL VENOUS BLD VENIPUNCTURE: CPT | Performed by: INTERNAL MEDICINE

## 2023-02-17 PROCEDURE — 83615 LACTATE (LD) (LDH) ENZYME: CPT | Performed by: INTERNAL MEDICINE

## 2023-02-17 PROCEDURE — 86335 IMMUNFIX E-PHORSIS/URINE/CSF: CPT | Performed by: PATHOLOGY

## 2023-02-17 PROCEDURE — 86335 IMMUNFIX E-PHORSIS/URINE/CSF: CPT | Mod: 26

## 2023-02-17 PROCEDURE — 99215 OFFICE O/P EST HI 40 MIN: CPT | Performed by: INTERNAL MEDICINE

## 2023-02-17 PROCEDURE — 84155 ASSAY OF PROTEIN SERUM: CPT | Mod: 91 | Performed by: INTERNAL MEDICINE

## 2023-02-17 PROCEDURE — 84165 PROTEIN E-PHORESIS SERUM: CPT | Mod: 26

## 2023-02-17 PROCEDURE — 82232 ASSAY OF BETA-2 PROTEIN: CPT | Performed by: INTERNAL MEDICINE

## 2023-02-17 PROCEDURE — 86334 IMMUNOFIX E-PHORESIS SERUM: CPT | Performed by: PATHOLOGY

## 2023-02-17 PROCEDURE — 84166 PROTEIN E-PHORESIS/URINE/CSF: CPT | Mod: 26

## 2023-02-17 PROCEDURE — 84165 PROTEIN E-PHORESIS SERUM: CPT | Mod: TC | Performed by: PATHOLOGY

## 2023-02-17 PROCEDURE — 84166 PROTEIN E-PHORESIS/URINE/CSF: CPT | Performed by: PATHOLOGY

## 2023-02-17 PROCEDURE — 82784 ASSAY IGA/IGD/IGG/IGM EACH: CPT | Performed by: INTERNAL MEDICINE

## 2023-02-17 PROCEDURE — 99417 PROLNG OP E/M EACH 15 MIN: CPT | Performed by: INTERNAL MEDICINE

## 2023-02-17 PROCEDURE — 85025 COMPLETE CBC W/AUTO DIFF WBC: CPT | Performed by: INTERNAL MEDICINE

## 2023-02-17 PROCEDURE — 83521 IG LIGHT CHAINS FREE EACH: CPT | Performed by: INTERNAL MEDICINE

## 2023-02-17 PROCEDURE — 86334 IMMUNOFIX E-PHORESIS SERUM: CPT | Mod: 26

## 2023-02-17 ASSESSMENT — PAIN SCALES - GENERAL: PAINLEVEL: NO PAIN (0)

## 2023-02-17 NOTE — PROGRESS NOTES
"Oncology Rooming Note    February 17, 2023 1:59 PM   Estephania Rubio is a 77 year old female who presents for:    Chief Complaint   Patient presents with     Oncology Clinic Visit     New consult MGUS (monoclonal gammopathy of unknown significance)     Initial Vitals: /74   Pulse 85   Resp 16   Ht 1.753 m (5' 9\")   Wt 93 kg (205 lb)   SpO2 96%   BMI 30.27 kg/m   Estimated body mass index is 30.27 kg/m  as calculated from the following:    Height as of this encounter: 1.753 m (5' 9\").    Weight as of this encounter: 93 kg (205 lb). Body surface area is 2.13 meters squared.  No Pain (0) Comment: Data Unavailable   No LMP recorded. Patient has had a hysterectomy.  Allergies reviewed: Yes  Medications reviewed: Yes    Medications: Medication refills not needed today.  Pharmacy name entered into [facility]:    [id number] [place] [address]  WRITTEN PRESCRIPTION REQUESTED    Clinical concerns: new consult MGUS Suzie Pierre            "

## 2023-02-17 NOTE — Clinical Note
"    2/17/2023         RE: Estephania Rubio  [address] [facility] [address]        Dear Colleague,    Thank you for referring your patient, Estephania Rubio, to the [facility] CANCER [facility]. Please see a copy of my visit note below.    Oncology Rooming Note    February 17, 2023 1:59 PM   Estephania Rubio is a 77 year old female who presents for:    Chief Complaint   Patient presents with     Oncology Clinic Visit     New consult MGUS (monoclonal gammopathy of unknown significance)     Initial Vitals: /74   Pulse 85   Resp 16   Ht 1.753 m (5' 9\")   Wt 93 kg (205 lb)   SpO2 96%   BMI 30.27 kg/m   Estimated body mass index is 30.27 kg/m  as calculated from the following:    Height as of this encounter: 1.753 m (5' 9\").    Weight as of this encounter: 93 kg (205 lb). Body surface area is 2.13 meters squared.  No Pain (0) Comment: Data Unavailable   No LMP recorded. Patient has had a hysterectomy.  Allergies reviewed: Yes  Medications reviewed: Yes    Medications: Medication refills not needed today.  Pharmacy name entered into [employer]:    [facility] [place] [address] [address]  WRITTEN PRESCRIPTION REQUESTED    Clinical concerns: new consult MGUS      Suzie Pierre              [facility] Hematology and Oncology Consult Note    Patient: Estephania Rubio  MRN: [id number]  Date of Service: Feb 17, 2023       Reason for Visit    Chief Complaint   Patient presents with     Oncology Clinic Visit     New consult MGUS (monoclonal gammopathy of unknown significance)         Assessment/Plan    #.  Monoclonal gammopathy.  #.  CKD-4  #.  History of early-stage ER positive, HER2 negative left breast cancer in 2017, post lumpectomy, adjuvant radiation and adjuvant endocrine therapy for 4 years.     I reviewed the clinical course and currently available labs result.  I also reviewed extensive medical records.  Most recent hemogram from 9/2022 showed mild normocytic anemia " otherwise unremarkable.  Calcium level from a week ago was normal.  Renal function appeared to be normal at her baseline.  No clear symptoms report of bone pain or other symptoms concerning for multiple myeloma.    I discussed about spectrum of plasma cell disease and evaluation.  I offered blood and urine test as well as bone survey for further evaluation of monoclonal gammopathy.  She agreed to proceed.   I discussed about potential neck step in management include bone marrow biopsy or clinical observation.  If this is determined MGUS, will continue clinical surveillance in 6 months.    Her daughter requested to send the results review via [employer].      ECOG Performance 2 - Ambulatory and independent in all ADLs; cannot work; up > 50% of the time    Encounter Diagnoses:    Problem List Items Addressed This Visit    None  Visit Diagnoses     MGUS (monoclonal gammopathy of unknown significance)    -  Primary    Relevant Orders    Lactate Dehydrogenase (Completed)    CBC with Platelets & Differential (Completed)    Comprehensive metabolic panel (Completed)    Protein electrophoresis    Protein immunofixation urine    Immunoglobulins A G and M    Protein electrophoresis random urine    Protein Immunofixation Serum    Kappa and lambda light chain    Beta 2 microglobulin    XR Bone Survey Complete (Completed)    CBC with Platelets & Differential    Comprehensive metabolic panel    Protein electrophoresis    Protein immunofixation urine    Immunoglobulins A G and M    Protein electrophoresis random urine    Protein Immunofixation Serum    Kappa and lambda light chain            ______________________________________________________________________________    History    Ms. Estephania Rubio is a very pleasant 77 year old female presented today accompanied by her daughter, Kaela.  She is referred here for further evaluation of monoclonal gammopathy.  She has chronic kidney disease which she follows with Dr. Al and  evaluation found she has monoclonal gammopathy.  The patient has some memory issue and history is supported by her daughter.  Upon further questioning, she does not have bone pain.  No fever or drenching sweats.  No unintentional weight loss.    She has history of early-stage breast cancer diagnosed in April 2017 in [place], [place].  It was estrogen receptor positive, HER2 negative.  She was treated with lumpectomy and adjuvant radiation.  She did not need adjuvant chemotherapy.  She was started with adjuvant letrozole after that.  She saw Dr. Slater in 11/2021.  It appeared that she was advised to discontinue letrozole and 4/2021 due to CVA, multiple comorbidities, uncontrolled blood pressure and hypercholesterolemia.    She has diabetes for about 17 years.  She also has high blood pressure.  She was diagnosed with chronic kidney disease around 2017.    She lives in the [facility] at [facility].  She does not smoke.  She does not drink alcohol.  Reported family history of lung cancer, breast cancer.  Her daughters added that multiple myeloma in her father, brother.    Review of systems.  Apart from describing in history, the remainder of comprehensive ROS was negative.    Past History    Past Medical History:   Diagnosis Date     CKD (chronic kidney disease)      Diabetes mellitus, type II (H)      Hyperlipidemia      Hypertension      No past surgical history on file.  No family history on file.  Social History     Socioeconomic History     Marital status:    Tobacco Use     Smoking status: Never     Smokeless tobacco: Never   Substance and Sexual Activity     Alcohol use: Not Currently     Drug use: Not Currently     Social Determinants of Health     Intimate Partner Violence: Not At Risk     Fear of Current or Ex-Partner: No     Emotionally Abused: No     Physically Abused: No     Sexually Abused: No       Allergies    No Known Allergies    Physical Exam    /74   Pulse 85   Resp 16   Ht  "1.753 m (5' 9\")   Wt 93 kg (205 lb)   SpO2 96%   BMI 30.27 kg/m      General: alert, awake, not in acute distress  HEENT: Head: Normal, normocephalic, atraumatic.  Eye: Normal external eye, conjunctiva, lids cornea, MICHAEL.  Nose: Normal external nose, mucus membranes and septum.  Pharynx: Normal buccal mucosa. Normal pharynx.  Neck / Thyroid: Supple, no masses, nodes, nodules or enlargement.  Lymphatics: No abnormally enlarged lymph nodes.  Chest: Normal chest wall and respirations. Clear to auscultation.  Breasts: No palpable discrete abnormalities.  Heart: S1 S2 RRR, no murmur.   Abdomen: abdomen is soft without significant tenderness, masses, organomegaly or guarding  Extremities: normal strength, tone, and muscle mass  Skin: normal. no rash or abnormalities  CNS: non focal.  Pleasantly confused.    Lab Results    Recent Results (from the past 168 hour(s))   Lactate Dehydrogenase   Result Value Ref Range    Lactate Dehydrogenase 241 (H) 125 - 220 U/L   Comprehensive metabolic panel   Result Value Ref Range    Sodium 139 136 - 145 mmol/L    Potassium 4.6 3.5 - 5.0 mmol/L    Chloride 105 98 - 107 mmol/L    Carbon Dioxide (CO2) 25 22 - 31 mmol/L    Anion Gap 9 5 - 18 mmol/L    Urea Nitrogen 22 8 - 28 mg/dL    Creatinine 1.93 (H) 0.60 - 1.10 mg/dL    Calcium 10.2 8.5 - 10.5 mg/dL    Glucose 152 (H) 70 - 125 mg/dL    Alkaline Phosphatase 137 (H) 45 - 120 U/L    AST 79 (H) 0 - 40 U/L    ALT 97 (H) 0 - 45 U/L    Protein Total 9.4 (H) 6.0 - 8.0 g/dL    Albumin 3.6 3.5 - 5.0 g/dL    Bilirubin Total 0.3 0.0 - 1.0 mg/dL    GFR Estimate 26 (L) >60 mL/min/1.73m2   CBC with platelets and differential   Result Value Ref Range    WBC Count 10.5 4.0 - 11.0 10e3/uL    RBC Count 4.76 3.80 - 5.20 10e6/uL    Hemoglobin 13.0 11.7 - 15.7 g/dL    Hematocrit 39.1 35.0 - 47.0 %    MCV 82 78 - 100 fL    MCH 27.3 26.5 - 33.0 pg    MCHC 33.2 31.5 - 36.5 g/dL    RDW 14.7 10.0 - 15.0 %    Platelet Count 301 150 - 450 10e3/uL    % Neutrophils " 68 %    % Lymphocytes 21 %    % Monocytes 9 %    % Eosinophils 1 %    % Basophils 1 %    % Immature Granulocytes 0 %    NRBCs per 100 WBC 0 <1 /100    Absolute Neutrophils 7.2 1.6 - 8.3 10e3/uL    Absolute Lymphocytes 2.2 0.8 - 5.3 10e3/uL    Absolute Monocytes 1.0 0.0 - 1.3 10e3/uL    Absolute Eosinophils 0.1 0.0 - 0.7 10e3/uL    Absolute Basophils 0.1 0.0 - 0.2 10e3/uL    Absolute Immature Granulocytes 0.0 <=0.4 10e3/uL    Absolute NRBCs 0.0 10e3/uL   Total Protein, Serum for ELP   Result Value Ref Range    Total Protein Serum for ELP 8.8 (H) 6.4 - 8.3 g/dL       Imaging Results    XR Bone Survey Complete    Result Date: 2/18/2023  EXAM: XR BONE SURVEY COMPLETE LOCATION: [facility] DATE/TIME: 02/18/2023, 9:42 AM INDICATION: MGUS (monoclonal gammopathy of unknown significance). COMPARISON: None.     IMPRESSION: 1.  No lytic lesions. No evidence of myeloma. 2.  Degenerative changes in the cervical, thoracic, and lumbar spine. Mild bilateral hip degenerative arthrosis. Mild bilateral knee degenerative arthrosis. Mild-moderate bilateral shoulder degenerative arthrosis. 3.  Event monitor projecting over the left chest. 4.  Cholecystectomy clips.     60 minutes spent on the date of the encounter doing chart review, history and exam, documentation and further activities as noted above.    Signed by: Cleopatra Schwab MD         Again, thank you for allowing me to participate in the care of your patient.        Sincerely,        Cleopatra Schwab MD

## 2023-02-17 NOTE — PROGRESS NOTES
[facility] Hematology and Oncology Consult Note    Patient: Estephania Rubio  MRN: [id number]  Date of Service: Feb 17, 2023       Reason for Visit    Chief Complaint   Patient presents with     Oncology Clinic Visit     New consult MGUS (monoclonal gammopathy of unknown significance)         Assessment/Plan    #.  Monoclonal gammopathy.  #.  CKD-4  #.  History of early-stage ER positive, HER2 negative left breast cancer in 2017, post lumpectomy, adjuvant radiation and adjuvant endocrine therapy for 4 years.     I reviewed the clinical course and currently available labs result.  I also reviewed extensive medical records.  Most recent hemogram from 9/2022 showed mild normocytic anemia otherwise unremarkable.  Calcium level from a week ago was normal.  Renal function appeared to be normal at her baseline.  No clear symptoms report of bone pain or other symptoms concerning for multiple myeloma.    I discussed about spectrum of plasma cell disease and evaluation.  I offered blood and urine test as well as bone survey for further evaluation of monoclonal gammopathy.  She agreed to proceed.   I discussed about potential neck step in management include bone marrow biopsy or clinical observation.  If this is determined MGUS, will continue clinical surveillance in 6 months.    Her daughter requested to send the results review via [employer].      ECOG Performance 2 - Ambulatory and independent in all ADLs; cannot work; up > 50% of the time    Encounter Diagnoses:    Problem List Items Addressed This Visit    None  Visit Diagnoses     MGUS (monoclonal gammopathy of unknown significance)    -  Primary    Relevant Orders    Lactate Dehydrogenase (Completed)    CBC with Platelets & Differential (Completed)    Comprehensive metabolic panel (Completed)    Protein electrophoresis    Protein immunofixation urine    Immunoglobulins A G and M    Protein electrophoresis random urine    Protein Immunofixation Serum    Kappa and  lambda light chain    Beta 2 microglobulin    XR Bone Survey Complete (Completed)    CBC with Platelets & Differential    Comprehensive metabolic panel    Protein electrophoresis    Protein immunofixation urine    Immunoglobulins A G and M    Protein electrophoresis random urine    Protein Immunofixation Serum    Kappa and lambda light chain            ______________________________________________________________________________    History    Ms. Estephania Rubio is a very pleasant 77 year old female presented today accompanied by her daughter, Kaela.  She is referred here for further evaluation of monoclonal gammopathy.  She has chronic kidney disease which she follows with Dr. Al and evaluation found she has monoclonal gammopathy.  The patient has some memory issue and history is supported by her daughter.  Upon further questioning, she does not have bone pain.  No fever or drenching sweats.  No unintentional weight loss.    She has history of early-stage breast cancer diagnosed in April 2017 in [place], [place].  It was estrogen receptor positive, HER2 negative.  She was treated with lumpectomy and adjuvant radiation.  She did not need adjuvant chemotherapy.  She was started with adjuvant letrozole after that.  She saw Dr. Slater in 11/2021.  It appeared that she was advised to discontinue letrozole and 4/2021 due to CVA, multiple comorbidities, uncontrolled blood pressure and hypercholesterolemia.    She has diabetes for about 17 years.  She also has high blood pressure.  She was diagnosed with chronic kidney disease around 2017.    She lives in the [facility].  She does not smoke.  She does not drink alcohol.  Reported family history of lung cancer, breast cancer.  Her daughters added that multiple myeloma in her father, brother.    Review of systems.  Apart from describing in history, the remainder of comprehensive ROS was negative.    Past History    Past Medical History:   Diagnosis Date  "    CKD (chronic kidney disease)      Diabetes mellitus, type II (H)      Hyperlipidemia      Hypertension      No past surgical history on file.  No family history on file.  Social History     Socioeconomic History     Marital status:    Tobacco Use     Smoking status: Never     Smokeless tobacco: Never   Substance and Sexual Activity     Alcohol use: Not Currently     Drug use: Not Currently     Social Determinants of Health     Intimate Partner Violence: Not At Risk     Fear of Current or Ex-Partner: No     Emotionally Abused: No     Physically Abused: No     Sexually Abused: No       Allergies    No Known Allergies    Physical Exam    /74   Pulse 85   Resp 16   Ht 1.753 m (5' 9\")   Wt 93 kg (205 lb)   SpO2 96%   BMI 30.27 kg/m      General: alert, awake, not in acute distress  HEENT: Head: Normal, normocephalic, atraumatic.  Eye: Normal external eye, conjunctiva, lids cornea, MICHAEL.  Nose: Normal external nose, mucus membranes and septum.  Pharynx: Normal buccal mucosa. Normal pharynx.  Neck / Thyroid: Supple, no masses, nodes, nodules or enlargement.  Lymphatics: No abnormally enlarged lymph nodes.  Chest: Normal chest wall and respirations. Clear to auscultation.  Breasts: No palpable discrete abnormalities.  Heart: S1 S2 RRR, no murmur.   Abdomen: abdomen is soft without significant tenderness, masses, organomegaly or guarding  Extremities: normal strength, tone, and muscle mass  Skin: normal. no rash or abnormalities  CNS: non focal.  Pleasantly confused.    Lab Results    Recent Results (from the past 168 hour(s))   Lactate Dehydrogenase   Result Value Ref Range    Lactate Dehydrogenase 241 (H) 125 - 220 U/L   Comprehensive metabolic panel   Result Value Ref Range    Sodium 139 136 - 145 mmol/L    Potassium 4.6 3.5 - 5.0 mmol/L    Chloride 105 98 - 107 mmol/L    Carbon Dioxide (CO2) 25 22 - 31 mmol/L    Anion Gap 9 5 - 18 mmol/L    Urea Nitrogen 22 8 - 28 mg/dL    Creatinine 1.93 (H) 0.60 " - 1.10 mg/dL    Calcium 10.2 8.5 - 10.5 mg/dL    Glucose 152 (H) 70 - 125 mg/dL    Alkaline Phosphatase 137 (H) 45 - 120 U/L    AST 79 (H) 0 - 40 U/L    ALT 97 (H) 0 - 45 U/L    Protein Total 9.4 (H) 6.0 - 8.0 g/dL    Albumin 3.6 3.5 - 5.0 g/dL    Bilirubin Total 0.3 0.0 - 1.0 mg/dL    GFR Estimate 26 (L) >60 mL/min/1.73m2   CBC with platelets and differential   Result Value Ref Range    WBC Count 10.5 4.0 - 11.0 10e3/uL    RBC Count 4.76 3.80 - 5.20 10e6/uL    Hemoglobin 13.0 11.7 - 15.7 g/dL    Hematocrit 39.1 35.0 - 47.0 %    MCV 82 78 - 100 fL    MCH 27.3 26.5 - 33.0 pg    MCHC 33.2 31.5 - 36.5 g/dL    RDW 14.7 10.0 - 15.0 %    Platelet Count 301 150 - 450 10e3/uL    % Neutrophils 68 %    % Lymphocytes 21 %    % Monocytes 9 %    % Eosinophils 1 %    % Basophils 1 %    % Immature Granulocytes 0 %    NRBCs per 100 WBC 0 <1 /100    Absolute Neutrophils 7.2 1.6 - 8.3 10e3/uL    Absolute Lymphocytes 2.2 0.8 - 5.3 10e3/uL    Absolute Monocytes 1.0 0.0 - 1.3 10e3/uL    Absolute Eosinophils 0.1 0.0 - 0.7 10e3/uL    Absolute Basophils 0.1 0.0 - 0.2 10e3/uL    Absolute Immature Granulocytes 0.0 <=0.4 10e3/uL    Absolute NRBCs 0.0 10e3/uL   Total Protein, Serum for ELP   Result Value Ref Range    Total Protein Serum for ELP 8.8 (H) 6.4 - 8.3 g/dL       Imaging Results    XR Bone Survey Complete    Result Date: 2/18/2023  EXAM: XR BONE SURVEY COMPLETE LOCATION: [facility] DATE/TIME: 02/18/2023, 9:42 AM INDICATION: MGUS (monoclonal gammopathy of unknown significance). COMPARISON: None.     IMPRESSION: 1.  No lytic lesions. No evidence of myeloma. 2.  Degenerative changes in the cervical, thoracic, and lumbar spine. Mild bilateral hip degenerative arthrosis. Mild bilateral knee degenerative arthrosis. Mild-moderate bilateral shoulder degenerative arthrosis. 3.  Event monitor projecting over the left chest. 4.  Cholecystectomy clips.     60 minutes spent on the date of the encounter doing chart  review, history and exam, documentation and further activities as noted above.    Signed by: Cleopatra Schwab MD

## 2023-02-17 NOTE — PATIENT INSTRUCTIONS
"Patient education:   Monoclonal gammopathy of undetermined significance(The Basics)     What is monoclonal gammopathy of undetermined significance?Monoclonal gammopathy of undetermined significance, called \"MGUS\" for short, is a condition that involves 1 type of white blood cell.White blood cells fight infections in the body. They are made in the bone marrow, which is the tissue in the center of your bones.  When people have MGUS, their bone marrow makes too many of 1 type of white blood cell.These white blood cells make a protein called monoclonal protein (M-protein).  MGUS causes no symptoms and, in most cases, does not lead to any problems. But in some cases, MGUS can turn into a serious condition. Oneserious condition is multiple myeloma, which is a cancer of the white blood cells involved in MGUS.  What are the symptoms of MGUS?MGUS does not cause any symptoms. Your doctor or nurse will suspect you have it after youhave lab tests done for another reason.  Is there a test for MGUS?Yes. If your doctor or nurse suspects you have MGUS from other lab test results, he or she will do an exam and further tests. These can include:  ?Blood tests  ?Urine tests  ?A bone marrow biopsy - For this test, a doctor takes a very small sample of the bone marrow. Then another doctor looks at the sample under a microscope to see which cellsare present.  ?Imaging tests, such as X-rays, CT scans, PET scans, or MRI scans - Imaging tests create pictures of the inside of the body.  How is MGUS treated?MGUS does not need treatment. But your doctorwill monitor your condition closely. That way, he or she will know if your MGUS turns into a serious condition that does need treatment.  To monitor your condition, your doctor will talk with you and do exams on a regularbasis. He or she might also order repeat blood and urine tests. How often these tests are done depends on your individual situation.  People with MGUS have a higher-than-normal " chance of breaking a bone. Because of this,your doctor will check you for osteoporosis, a disease that makes your bones weak. If you have osteoporosis, he or she will treat it. If you don't have osteoporosis, your doctor will recommend things you can do to help keepyour bones strong. This includes getting enough calcium and vitamin D.  What symptoms should I watch for?You should watch for symptoms that could mean your MGUS has changed into a serious condition. This change can happenquickly. Let your doctor or nurse know right away if you have any of the following symptoms:  ?Bone pain  ?Feeling more tired or weak than usual  ?Fever  ?Night sweats that soak yourclothes  ?Headache or dizziness  ?Weight loss  ?Numbness, tingling, or weakness in the chest, lower back, or legs  ?Blurry vision or trouble hearing  ?Bleeding more than usual  These symptoms can also be caused by other conditions that are not serious. But your doctor or nurse will want to check that your MGUS hasn't changed into a condition that needs treatment.

## 2023-02-18 ENCOUNTER — HOSPITAL ENCOUNTER (OUTPATIENT)
Dept: RADIOLOGY | Facility: CLINIC | Age: 78
Discharge: HOME | End: 2023-02-18
Attending: INTERNAL MEDICINE | Admitting: INTERNAL MEDICINE
Payer: COMMERCIAL

## 2023-02-18 DIAGNOSIS — D47.2 MGUS (MONOCLONAL GAMMOPATHY OF UNKNOWN SIGNIFICANCE): ICD-10-CM

## 2023-02-18 PROCEDURE — 77075 RADEX OSSEOUS SURVEY COMPL: CPT

## 2023-02-20 LAB
ALBUMIN MFR UR ELPH: 70.1 %
ALBUMIN SERPL ELPH-MCNC: 4.1 G/DL (ref 3.7–5.1)
ALPHA1 GLOB MFR UR ELPH: 4.3 %
ALPHA1 GLOB SERPL ELPH-MCNC: 0.4 G/DL (ref 0.2–0.4)
ALPHA2 GLOB MFR UR ELPH: 5.5 %
ALPHA2 GLOB SERPL ELPH-MCNC: 0.9 G/DL (ref 0.5–0.9)
B-GLOBULIN MFR UR ELPH: 8.7 %
B-GLOBULIN SERPL ELPH-MCNC: 0.9 G/DL (ref 0.6–1)
B2 MICROGLOB TUMOR MARKER SER-MCNC: 4.1 MG/L
GAMMA GLOB MFR UR ELPH: 11.4 %
GAMMA GLOB SERPL ELPH-MCNC: 2.5 G/DL (ref 0.7–1.6)
IGA SERPL-MCNC: 243 MG/DL (ref 84–499)
IGG SERPL-MCNC: 2999 MG/DL (ref 610–1616)
IGM SERPL-MCNC: 55 MG/DL (ref 35–242)
KAPPA LC FREE SER-MCNC: 7.38 MG/DL (ref 0.33–1.94)
KAPPA LC FREE/LAMBDA FREE SER NEPH: 3.65 {RATIO} (ref 0.26–1.65)
LAMBDA LC FREE SERPL-MCNC: 2.02 MG/DL (ref 0.57–2.63)
M PROTEIN MFR UR ELPH: 5.5 %
M PROTEIN SERPL ELPH-MCNC: 1.9 G/DL
PROT ELPH PNL UR ELPH: NORMAL
PROT PATTERN SERPL ELPH-IMP: ABNORMAL
PROT PATTERN SERPL IFE-IMP: NORMAL
PROT PATTERN UR ELPH-IMP: ABNORMAL

## 2023-03-01 ENCOUNTER — PATIENT OUTREACH (OUTPATIENT)
Dept: ONCOLOGY | Facility: CLINIC | Age: 78
End: 2023-03-01
Payer: COMMERCIAL

## 2023-03-01 NOTE — PROGRESS NOTES
"[facility]: Cancer Care Short Note                                    Discussion with Patient:                                                      Per patient's daughter's request, Dr. Schwab sent [employer] message on 2/26/23 with message about recent lab results. This message hasn't been read yet.    Assessment:                                                      Per Dr. Schwab \"Your blood test showed that you have abnormal blood protein as well as urine protein. Your Xray was normal. I suggest you to proceed with bone marrow biopsy in a few weeks to further clarify the % of abnormal plasma cells in the bone marrow. It is important to get that to have a better understanding and classification of what stages of plasma cell disease you have.      Please let us know if you want to further discuss in person or by video.\"    Intervention/Education provided during outreach:                                                       Called patient and left message (no patient identifiers) stating noticed [employer] message not read, wanting to make sure saw it and answer any questions and possible schedule follow-up appointments. Requested call back.    Patient to follow up as scheduled at next appt    Signature:  Lise Kelley RN  "

## 2023-03-06 ENCOUNTER — TELEPHONE (OUTPATIENT)
Dept: ONCOLOGY | Facility: CLINIC | Age: 78
End: 2023-03-06
Payer: COMMERCIAL

## 2023-03-06 NOTE — TELEPHONE ENCOUNTER
Patient's daughter Kaela calls leaving message on triage voicemail. Lise had left a message with patient on 3/1, Dr. Schwab had sent a message on [employer] explaining lab results and recommendation to schedule bone marrow biopsy. Daughter is calling to get patient set up for biopsy. Kaela is transferred to scheduling for further assistance.    Philomena Verduzco RN

## 2023-03-13 DIAGNOSIS — D47.2 MGUS (MONOCLONAL GAMMOPATHY OF UNKNOWN SIGNIFICANCE): Primary | ICD-10-CM

## 2023-03-13 RX ORDER — LIDOCAINE HYDROCHLORIDE 10 MG/ML
10 INJECTION, SOLUTION EPIDURAL; INFILTRATION; INTRACAUDAL; PERINEURAL ONCE
Status: CANCELLED | OUTPATIENT
Start: 2023-03-13 | End: 2023-03-13

## 2023-03-13 RX ORDER — HYDROCODONE BITARTRATE AND ACETAMINOPHEN 5; 325 MG/1; MG/1
1 TABLET ORAL ONCE
Status: CANCELLED | OUTPATIENT
Start: 2023-03-13 | End: 2023-03-13

## 2023-03-16 ENCOUNTER — PROCEDURE ONLY VISIT (OUTPATIENT)
Dept: PATHOLOGY | Facility: CLINIC | Age: 78
End: 2023-03-16

## 2023-03-16 ENCOUNTER — PROCEDURE ONLY VISIT (OUTPATIENT)
Dept: INFUSION THERAPY | Facility: CLINIC | Age: 78
End: 2023-03-16
Attending: INTERNAL MEDICINE
Payer: COMMERCIAL

## 2023-03-16 VITALS
HEART RATE: 71 BPM | WEIGHT: 205 LBS | SYSTOLIC BLOOD PRESSURE: 160 MMHG | TEMPERATURE: 97.6 F | BODY MASS INDEX: 30.27 KG/M2 | RESPIRATION RATE: 16 BRPM | OXYGEN SATURATION: 96 % | DIASTOLIC BLOOD PRESSURE: 72 MMHG

## 2023-03-16 DIAGNOSIS — D47.2 MGUS (MONOCLONAL GAMMOPATHY OF UNKNOWN SIGNIFICANCE): Primary | ICD-10-CM

## 2023-03-16 DIAGNOSIS — D47.2 MGUS (MONOCLONAL GAMMOPATHY OF UNKNOWN SIGNIFICANCE): ICD-10-CM

## 2023-03-16 LAB
BASOPHILS # BLD AUTO: 0 10E3/UL (ref 0–0.2)
BASOPHILS NFR BLD AUTO: 0 %
EOSINOPHIL # BLD AUTO: 0.1 10E3/UL (ref 0–0.7)
EOSINOPHIL NFR BLD AUTO: 1 %
ERYTHROCYTE [DISTWIDTH] IN BLOOD BY AUTOMATED COUNT: 14.5 % (ref 10–15)
HCT VFR BLD AUTO: 37.9 % (ref 35–47)
HGB BLD-MCNC: 12.8 G/DL (ref 11.7–15.7)
IMM GRANULOCYTES # BLD: 0 10E3/UL
IMM GRANULOCYTES NFR BLD: 0 %
LYMPHOCYTES # BLD AUTO: 2.7 10E3/UL (ref 0.8–5.3)
LYMPHOCYTES NFR BLD AUTO: 30 %
MCH RBC QN AUTO: 27.4 PG (ref 26.5–33)
MCHC RBC AUTO-ENTMCNC: 33.8 G/DL (ref 31.5–36.5)
MCV RBC AUTO: 81 FL (ref 78–100)
MONOCYTES # BLD AUTO: 0.6 10E3/UL (ref 0–1.3)
MONOCYTES NFR BLD AUTO: 7 %
NEUTROPHILS # BLD AUTO: 5.7 10E3/UL (ref 1.6–8.3)
NEUTROPHILS NFR BLD AUTO: 62 %
NRBC # BLD AUTO: 0 10E3/UL
NRBC BLD AUTO-RTO: 0 /100
PLATELET # BLD AUTO: 292 10E3/UL (ref 150–450)
RBC # BLD AUTO: 4.68 10E6/UL (ref 3.8–5.2)
WBC # BLD AUTO: 9.2 10E3/UL (ref 4–11)

## 2023-03-16 PROCEDURE — 88305 TISSUE EXAM BY PATHOLOGIST: CPT | Mod: 26 | Performed by: PATHOLOGY

## 2023-03-16 PROCEDURE — 85025 COMPLETE CBC W/AUTO DIFF WBC: CPT

## 2023-03-16 PROCEDURE — 38222 DX BONE MARROW BX & ASPIR: CPT | Performed by: PATHOLOGY

## 2023-03-16 PROCEDURE — 88291 CYTO/MOLECULAR REPORT: CPT | Performed by: MEDICAL GENETICS

## 2023-03-16 PROCEDURE — 88313 SPECIAL STAINS GROUP 2: CPT | Mod: TC | Performed by: PATHOLOGY

## 2023-03-16 PROCEDURE — 88311 DECALCIFY TISSUE: CPT | Mod: 26 | Performed by: PATHOLOGY

## 2023-03-16 PROCEDURE — 88237 TISSUE CULTURE BONE MARROW: CPT | Performed by: PATHOLOGY

## 2023-03-16 PROCEDURE — 88342 IMHCHEM/IMCYTCHM 1ST ANTB: CPT | Mod: 26 | Performed by: PATHOLOGY

## 2023-03-16 PROCEDURE — 88364 INSITU HYBRIDIZATION (FISH): CPT | Mod: 26 | Performed by: PATHOLOGY

## 2023-03-16 PROCEDURE — 88368 INSITU HYBRIDIZATION MANUAL: CPT | Mod: 26 | Performed by: MEDICAL GENETICS

## 2023-03-16 PROCEDURE — 88275 CYTOGENETICS 100-300: CPT | Mod: XU | Performed by: PATHOLOGY

## 2023-03-16 PROCEDURE — 85097 BONE MARROW INTERPRETATION: CPT | Performed by: PATHOLOGY

## 2023-03-16 PROCEDURE — 88369 M/PHMTRC ALYSISHQUANT/SEMIQ: CPT | Mod: 26 | Performed by: MEDICAL GENETICS

## 2023-03-16 PROCEDURE — 88313 SPECIAL STAINS GROUP 2: CPT | Mod: 26 | Performed by: PATHOLOGY

## 2023-03-16 PROCEDURE — 88264 CHROMOSOME ANALYSIS 20-25: CPT | Performed by: PATHOLOGY

## 2023-03-16 PROCEDURE — 88341 IMHCHEM/IMCYTCHM EA ADD ANTB: CPT | Mod: 26 | Performed by: PATHOLOGY

## 2023-03-16 PROCEDURE — 88365 INSITU HYBRIDIZATION (FISH): CPT | Mod: 26 | Performed by: PATHOLOGY

## 2023-03-16 PROCEDURE — 88271 CYTOGENETICS DNA PROBE: CPT | Mod: XU | Performed by: PATHOLOGY

## 2023-03-16 PROCEDURE — 88305 TISSUE EXAM BY PATHOLOGIST: CPT | Mod: TC | Performed by: PATHOLOGY

## 2023-03-16 PROCEDURE — 36415 COLL VENOUS BLD VENIPUNCTURE: CPT

## 2023-03-16 RX ORDER — DONEPEZIL HYDROCHLORIDE 10 MG/1
10 TABLET, FILM COATED ORAL AT BEDTIME
COMMUNITY
Start: 2023-02-16

## 2023-03-16 RX ORDER — ASPIRIN 325 MG
1 TABLET ORAL DAILY
COMMUNITY

## 2023-03-16 RX ORDER — LISINOPRIL 40 MG/1
40 TABLET ORAL DAILY
COMMUNITY
Start: 2023-03-15

## 2023-03-16 RX ORDER — SODIUM BICARBONATE 650 MG/1
650 TABLET ORAL 4 TIMES DAILY
COMMUNITY
Start: 2023-01-06 | End: 2024-01-06

## 2023-03-16 RX ORDER — LIDOCAINE HYDROCHLORIDE 10 MG/ML
10 INJECTION, SOLUTION EPIDURAL; INFILTRATION; INTRACAUDAL; PERINEURAL ONCE
Status: ACTIVE | OUTPATIENT
Start: 2023-03-16

## 2023-03-16 RX ORDER — HYDROCODONE BITARTRATE AND ACETAMINOPHEN 5; 325 MG/1; MG/1
1 TABLET ORAL ONCE
Status: ACTIVE | OUTPATIENT
Start: 2023-03-16

## 2023-03-16 RX ORDER — SENNOSIDES A AND B 8.6 MG/1
2 TABLET, FILM COATED ORAL 2 TIMES DAILY PRN
COMMUNITY

## 2023-03-16 RX ORDER — EMOLLIENT BASE
1 CREAM (GRAM) TOPICAL AT BEDTIME
COMMUNITY

## 2023-03-16 ASSESSMENT — PAIN SCALES - GENERAL
PAINLEVEL: NO PAIN (0)
PAINLEVEL: NO PAIN (0)

## 2023-03-16 NOTE — PROCEDURES
BONE MARROW BIOPSY AND ASPIRATE PROCESDURE NOTE    The procedure is discussed with the patient.    40 ml 1% lidocaine is administered.    Left PIC bone marrow biopsy and aspirate were performed.    Less than 5 ml blood loss.   The patient tolerated the procedure well.    Specimens submitted for testing.

## 2023-03-16 NOTE — PROGRESS NOTES
Estephania was at clinic today for a unilaeral bone marrow biopsy  Consent for procedure was reviewed with Dr. Cagle and signed. No premedication  was administered as patient declined. Education was reviewed about the biopsy procedure and symptoms post biopsy to monitor. Educational handout was provided and contact information highlighted for any questions or concerns that may arise. Dressing was inspected and  was dry and  intact with no shadow of blood on the dressing. Dressing was reinforced as tape was not fully adhering. Estephania  left clinic via wheel chair and daughter, Kaela, provided transportation home to her living facility. Future follow up is scheduled with Dr. Schwab on 3/30/23 to review results. Of note, instructed patient and daughter to bring copy of medication list from Assisted living facility for future medication reconciliation./Evon Prieto RN

## 2023-03-20 LAB
PATH REPORT.COMMENTS IMP SPEC: ABNORMAL
PATH REPORT.COMMENTS IMP SPEC: YES
PATH REPORT.FINAL DX SPEC: ABNORMAL
PATH REPORT.MICROSCOPIC SPEC OTHER STN: ABNORMAL
PATH REPORT.RELEVANT HX SPEC: ABNORMAL

## 2023-03-23 LAB
CULTURE HARVEST COMPLETE DATE: NORMAL
INTERPRETATION: NORMAL

## 2023-03-24 ENCOUNTER — LAB REQUISITION (OUTPATIENT)
Dept: LAB | Facility: CLINIC | Age: 78
End: 2023-03-24
Payer: COMMERCIAL

## 2023-03-24 DIAGNOSIS — N18.9 CHRONIC KIDNEY DISEASE, UNSPECIFIED: ICD-10-CM

## 2023-03-27 ENCOUNTER — LAB REQUISITION (OUTPATIENT)
Dept: LAB | Facility: CLINIC | Age: 78
End: 2023-03-27
Payer: COMMERCIAL

## 2023-03-27 DIAGNOSIS — N18.9 CHRONIC KIDNEY DISEASE, UNSPECIFIED: ICD-10-CM

## 2023-03-27 LAB
ALBUMIN SERPL BCG-MCNC: 3.4 G/DL (ref 3.5–5.2)
ANION GAP SERPL CALCULATED.3IONS-SCNC: 9 MMOL/L (ref 7–15)
BUN SERPL-MCNC: 29.7 MG/DL (ref 8–23)
CALCIUM SERPL-MCNC: 9.5 MG/DL (ref 8.8–10.2)
CHLORIDE SERPL-SCNC: 105 MMOL/L (ref 98–107)
CREAT SERPL-MCNC: 1.88 MG/DL (ref 0.51–0.95)
DEPRECATED HCO3 PLAS-SCNC: 24 MMOL/L (ref 22–29)
ERYTHROCYTE [DISTWIDTH] IN BLOOD BY AUTOMATED COUNT: 14.8 % (ref 10–15)
GFR SERPL CREATININE-BSD FRML MDRD: 27 ML/MIN/1.73M2
GLUCOSE SERPL-MCNC: 187 MG/DL (ref 70–99)
HCT VFR BLD AUTO: 36.6 % (ref 35–47)
HGB BLD-MCNC: 11.8 G/DL (ref 11.7–15.7)
MCH RBC QN AUTO: 27.4 PG (ref 26.5–33)
MCHC RBC AUTO-ENTMCNC: 32.2 G/DL (ref 31.5–36.5)
MCV RBC AUTO: 85 FL (ref 78–100)
PHOSPHATE SERPL-MCNC: 3.1 MG/DL (ref 2.5–4.5)
PLATELET # BLD AUTO: 279 10E3/UL (ref 150–450)
POTASSIUM SERPL-SCNC: 4.8 MMOL/L (ref 3.4–5.3)
PTH-INTACT SERPL-MCNC: 110 PG/ML (ref 15–65)
RBC # BLD AUTO: 4.31 10E6/UL (ref 3.8–5.2)
SODIUM SERPL-SCNC: 138 MMOL/L (ref 136–145)
WBC # BLD AUTO: 8.4 10E3/UL (ref 4–11)

## 2023-03-27 PROCEDURE — 80069 RENAL FUNCTION PANEL: CPT | Mod: ORL | Performed by: INTERNAL MEDICINE

## 2023-03-27 PROCEDURE — P9603 ONE-WAY ALLOW PRORATED MILES: HCPCS | Mod: ORL | Performed by: INTERNAL MEDICINE

## 2023-03-27 PROCEDURE — 82306 VITAMIN D 25 HYDROXY: CPT | Mod: ORL | Performed by: INTERNAL MEDICINE

## 2023-03-27 PROCEDURE — 36415 COLL VENOUS BLD VENIPUNCTURE: CPT | Mod: ORL | Performed by: INTERNAL MEDICINE

## 2023-03-27 PROCEDURE — 85027 COMPLETE CBC AUTOMATED: CPT | Mod: ORL | Performed by: INTERNAL MEDICINE

## 2023-03-27 PROCEDURE — 83970 ASSAY OF PARATHORMONE: CPT | Mod: ORL | Performed by: INTERNAL MEDICINE

## 2023-03-28 LAB — DEPRECATED CALCIDIOL+CALCIFEROL SERPL-MC: 36 UG/L (ref 20–75)

## 2023-03-30 ENCOUNTER — TELEPHONE (OUTPATIENT)
Dept: ONCOLOGY | Facility: CLINIC | Age: 78
End: 2023-03-30

## 2023-03-30 ENCOUNTER — ONCOLOGY VISIT (OUTPATIENT)
Dept: ONCOLOGY | Facility: CLINIC | Age: 78
End: 2023-03-30
Attending: INTERNAL MEDICINE
Payer: COMMERCIAL

## 2023-03-30 ENCOUNTER — LAB (OUTPATIENT)
Dept: INFUSION THERAPY | Facility: CLINIC | Age: 78
End: 2023-03-30
Attending: INTERNAL MEDICINE
Payer: COMMERCIAL

## 2023-03-30 VITALS
TEMPERATURE: 97.5 F | HEIGHT: 69 IN | RESPIRATION RATE: 18 BRPM | BODY MASS INDEX: 30.26 KG/M2 | DIASTOLIC BLOOD PRESSURE: 99 MMHG | SYSTOLIC BLOOD PRESSURE: 166 MMHG | HEART RATE: 77 BPM | OXYGEN SATURATION: 96 %

## 2023-03-30 DIAGNOSIS — C90.00 MULTIPLE MYELOMA NOT HAVING ACHIEVED REMISSION (H): Primary | ICD-10-CM

## 2023-03-30 PROCEDURE — 99212 OFFICE O/P EST SF 10 MIN: CPT | Performed by: INTERNAL MEDICINE

## 2023-03-30 PROCEDURE — 99215 OFFICE O/P EST HI 40 MIN: CPT | Performed by: INTERNAL MEDICINE

## 2023-03-30 RX ORDER — PROCHLORPERAZINE MALEATE 10 MG
10 TABLET ORAL EVERY 6 HOURS PRN
Qty: 30 TABLET | Refills: 2 | Status: SHIPPED | OUTPATIENT
Start: 2023-03-30 | End: 2023-04-03 | Stop reason: ALTCHOICE

## 2023-03-30 RX ORDER — PROCHLORPERAZINE MALEATE 10 MG
10 TABLET ORAL EVERY 6 HOURS PRN
Qty: 30 TABLET | Refills: 2 | Status: SHIPPED | OUTPATIENT
Start: 2023-03-30 | End: 2023-03-30

## 2023-03-30 ASSESSMENT — PAIN SCALES - GENERAL: PAINLEVEL: NO PAIN (0)

## 2023-03-30 NOTE — Clinical Note
3/30/2023         RE: Estephania Rubio  [address] [facility] [address]        Dear Colleague,    Thank you for referring your patient, Estephania Rubio, to the [facility] [place]. Please see a copy of my visit note below.    [facility] Hematology and Oncology Progress Note    Patient: Estephania Rubio  MRN: [id number]  Date of Service: Mar 30, 2023         Reason for Visit    Chief Complaint   Patient presents with     Oncology Clinic Visit     Malignant neoplasm of upper-outer quadrant of left breast in female, estrogen receptor positive     Malignant neoplasm of upper-outer quadrant of left breast i       Assessment and Plan     Cancer Staging   No matching staging information was found for the patient.    ECOG Performance    0 - Independent     Pain  Pain Score: No Pain (0)    #.  Multiple myeloma, stage II.  FISH pending   I reviewed the myeloma lab, our x-ray, or bone marrow biopsy results in detail with the patient.  Based on the plasma cell percentage in the bone marrow of 10-20% with creatinine of 2 (which however has been fluctuating renal function for the last 3 years), she meets criteria for multiple myeloma.  I also requested to obtain urine protein and urine albumin creatinine ratio to evaluate for renal significance.  She is unable to provide urine test today and she will complete at a later date.   Myeloma FISH is pending for additional restratification.   Based on the current finding, I offered 2 options of close monitoring with labs and exam in 2-3 months or initiating treatment with Revlimid/Dex.  She does not like to come into the clinic once a week for Velcade or Darzalex injection.  I reviewed the side effects and schedule of Revlimid and dexamethasone today.  I we will plan to start with Revlimid 10 mg daily (dose adjusted based on renal function) with low-dose dexamethasone 10 mg weekly.   She lives in a memory care unit.  Her daughter consented and manages  this treatment by coordinating between us and her care facility.   Follow-up labs and provider visit about 2 weeks after starting Revlimid and then during her off week.    Discussed with pharmacy.    Encounter Diagnoses:    Problem List Items Addressed This Visit        Oncology Diagnoses    Multiple myeloma not having achieved remission (H) - Primary    Relevant Medications    prochlorperazine (COMPAZINE) 10 MG tablet    LENalidomide (REVLIMID) 10 MG CAPS capsule    dexamethasone (DECADRON) 4 MG tablet    Other Relevant Orders    CBC with platelets differential    Comprehensive metabolic panel    Protein  random urine    Albumin Random Urine Quantitative with Creat Ratio    CBC with platelets differential    MD Instruction for Protocol    MD Instruction for Protocol 2    Treatment Conditions    Protein electrophoresis    Immunoglobulins A G and M    Immunoglobulin Total Light Chains, Urine    Kappa and lambda light chain    Protein electrophoresis random urine    Protein immunofixation urine    CBC with platelets differential    Comprehensive metabolic panel          CC: Historical Provider   ______________________________________________________________________________  Diagnosis  3/2023-evaluation for monoclonal gammopathy during evaluation for worsening chronic kidney disease.   CBC showed normal WBC, hemoglobin and platelets   Creatinine 1.93, calcium 10.2.   IgG 2999, kappa 7.38, lambda 2.02, kappa/lambda ratio 3.65, SPEP-M spike 1.9, IF- IgG kappa   UPEP showed 5.5% monoclonal protein of IgG kappa.   Beta 2 microglobulin 4.1   Skeletal survey was negative for lytic lesions   Bone marrow biopsy showed hypercellular marrow involved by CD20 positive plasma cell dyscrasia (10-20%).   No evidence of IGH::CCND1 fusion   Myeloma FISH- pending   Chromosome analysis-pending    Treatment to date  4/2023-plan to start Revlimid 10 mg (1-21/28 day cycle), dexamethasone 10 mg weekly    History of Present Illness    Ms.  "Estephania Rubio presented today accompanied by her daughter to review the labs/imaging and bone marrow biopsy results and next step in management.  She is in memory care unit.  No new health issues.  She does not have any bone pain.  Her appetite is good.    Review of systems  Apart from describing in HPI, the remainder of comprehensive ROS was negative.    Past History    Past Medical History:   Diagnosis Date     CKD (chronic kidney disease)      Diabetes mellitus, type II (H)      Hyperlipidemia      Hypertension        No past surgical history on file.    Physical Exam    BP (!) 166/99 (Patient Position: Sitting)   Pulse 77   Temp 97.5  F (36.4  C)   Resp 18   Ht 1.753 m (5' 9.02\")   SpO2 96%   BMI 30.26 kg/m      General: alert, awake, not in acute distress  HEENT: Head: Normal, normocephalic, atraumatic.  Eye: Normal external eye, conjunctiva, lids cornea, MICHAEL.  Nose: Normal external nose, mucus membranes and septum.  Pharynx: Normal buccal mucosa. Normal pharynx.  Neck / Thyroid: Supple, no masses, nodes, nodules or enlargement.  Lymphatics: No abnormally enlarged lymph nodes.  Chest: Normal chest wall and respirations. Clear to auscultation.  Heart: S1 S2 RRR, no murmur.   Abdomen: abdomen is soft without significant tenderness, masses, organomegaly or guarding  Extremities: normal strength, tone, and muscle mass  Skin: normal. no rash or abnormalities  CNS: non focal.    Lab Results    Recent Results (from the past 168 hour(s))   Renal panel   Result Value Ref Range    Sodium 138 136 - 145 mmol/L    Potassium 4.8 3.4 - 5.3 mmol/L    Chloride 105 98 - 107 mmol/L    Carbon Dioxide (CO2) 24 22 - 29 mmol/L    Anion Gap 9 7 - 15 mmol/L    Glucose 187 (H) 70 - 99 mg/dL    Urea Nitrogen 29.7 (H) 8.0 - 23.0 mg/dL    Creatinine 1.88 (H) 0.51 - 0.95 mg/dL    GFR Estimate 27 (L) >60 mL/min/1.73m2    Calcium 9.5 8.8 - 10.2 mg/dL    Albumin 3.4 (L) 3.5 - 5.2 g/dL    Phosphorus 3.1 2.5 - 4.5 mg/dL   Parathyroid " "Hormone Intact   Result Value Ref Range    Parathyroid Hormone Intact 110 (H) 15 - 65 pg/mL   Vitamin D Deficiency   Result Value Ref Range    Vitamin D, Total (25-Hydroxy) 36 20 - 75 ug/L   CBC with platelets   Result Value Ref Range    WBC Count 8.4 4.0 - 11.0 10e3/uL    RBC Count 4.31 3.80 - 5.20 10e6/uL    Hemoglobin 11.8 11.7 - 15.7 g/dL    Hematocrit 36.6 35.0 - 47.0 %    MCV 85 78 - 100 fL    MCH 27.4 26.5 - 33.0 pg    MCHC 32.2 31.5 - 36.5 g/dL    RDW 14.8 10.0 - 15.0 %    Platelet Count 279 150 - 450 10e3/uL       Imaging    No results found.    40 minutes spent on the date of the encounter doing chart review, history and exam, documentation and further activities as noted above.    Signed by: Cleopatra Schwab MD      Oncology Rooming Note    March 30, 2023 9:41 AM   Estephania Rubio is a 77 year old female who presents for:    Chief Complaint   Patient presents with     Oncology Clinic Visit     Malignant neoplasm of upper-outer quadrant of left breast in female, estrogen receptor positive     Malignant neoplasm of upper-outer quadrant of left breast i     Initial Vitals: BP (!) 166/99 (Patient Position: Sitting)   Pulse 77   Temp 97.5  F (36.4  C)   Resp 18   Ht 1.753 m (5' 9.02\")   SpO2 96%   BMI 30.26 kg/m   Estimated body mass index is 30.26 kg/m  as calculated from the following:    Height as of this encounter: 1.753 m (5' 9.02\").    Weight as of 3/16/23: 93 kg (205 lb). Body surface area is 2.13 meters squared.  No Pain (0) Comment: Data Unavailable   No LMP recorded. Patient has had a hysterectomy.  Allergies reviewed: Yes  Medications reviewed: Yes    Medications: Medication refills not needed today.  Pharmacy name entered into [employer]:    [facility] [id number] [place] [address] [address]  WRITTEN PRESCRIPTION REQUESTED    Clinical concerns:       Guerline Moscoso CMA                Again, thank you for allowing me to participate in the care of your patient.  "       Sincerely,        Cleopatra Schwab MD

## 2023-03-30 NOTE — TELEPHONE ENCOUNTER
Bernie, staff at [facility], calls regarding appointment patient had today with Dr. Schwab. Bernie reports that they received some paperwork but the medication list is wrong. She will fax over the current medication list they have for review. They also have questions regarding the compazine that was prescribed and are looking for an order to give it. Fax number for memory care unit is [phone]. Phone number for [facility] care [phone], [facility] main number [phone].    Philomena Verduzco RN

## 2023-03-30 NOTE — PROGRESS NOTES
"Oncology Rooming Note    March 30, 2023 9:41 AM   Estephania Rubio is a 77 year old female who presents for:    Chief Complaint   Patient presents with     Oncology Clinic Visit     Malignant neoplasm of upper-outer quadrant of left breast in female, estrogen receptor positive     Malignant neoplasm of upper-outer quadrant of left breast i     Initial Vitals: BP (!) 166/99 (Patient Position: Sitting)   Pulse 77   Temp 97.5  F (36.4  C)   Resp 18   Ht 1.753 m (5' 9.02\")   SpO2 96%   BMI 30.26 kg/m   Estimated body mass index is 30.26 kg/m  as calculated from the following:    Height as of this encounter: 1.753 m (5' 9.02\").    Weight as of 3/16/23: 93 kg (205 lb). Body surface area is 2.13 meters squared.  No Pain (0) Comment: Data Unavailable   No LMP recorded. Patient has had a hysterectomy.  Allergies reviewed: Yes  Medications reviewed: Yes    Medications: Medication refills not needed today.  Pharmacy name entered into [employer]:    [facility] [id number] [place] [address] [address]  WRITTEN PRESCRIPTION REQUESTED    Clinical concerns:       Guerline Moscoso CMA            "

## 2023-03-30 NOTE — PROGRESS NOTES
[facility] Hematology and Oncology Progress Note    Patient: Estephania Rubio  MRN: [id number]  Date of Service: Mar 30, 2023         Reason for Visit    Chief Complaint   Patient presents with     Oncology Clinic Visit     Malignant neoplasm of upper-outer quadrant of left breast in female, estrogen receptor positive     Malignant neoplasm of upper-outer quadrant of left breast i       Assessment and Plan     Cancer Staging   No matching staging information was found for the patient.    ECOG Performance    0 - Independent     Pain  Pain Score: No Pain (0)    #.  Multiple myeloma, stage II.  FISH pending   I reviewed the myeloma lab, our x-ray, or bone marrow biopsy results in detail with the patient.  Based on the plasma cell percentage in the bone marrow of 10-20% with creatinine of 2 (which however has been fluctuating renal function for the last 3 years), she meets criteria for multiple myeloma.  I also requested to obtain urine protein and urine albumin creatinine ratio to evaluate for renal significance.  She is unable to provide urine test today and she will complete at a later date.   Myeloma FISH is pending for additional restratification.   Based on the current finding, I offered 2 options of close monitoring with labs and exam in 2-3 months or initiating treatment with Revlimid/Dex.  She does not like to come into the clinic once a week for Velcade or Darzalex injection.  I reviewed the side effects and schedule of Revlimid and dexamethasone today.  I we will plan to start with Revlimid 10 mg daily (dose adjusted based on renal function) with low-dose dexamethasone 10 mg weekly.   She lives in a memory care unit.  Her daughter consented and manages this treatment by coordinating between us and her care facility.   Follow-up labs and provider visit about 2 weeks after starting Revlimid and then during her off week.    Discussed with pharmacy.    Encounter Diagnoses:    Problem List Items Addressed  This Visit        Oncology Diagnoses    Multiple myeloma not having achieved remission (H) - Primary    Relevant Medications    prochlorperazine (COMPAZINE) 10 MG tablet    LENalidomide (REVLIMID) 10 MG CAPS capsule    dexamethasone (DECADRON) 4 MG tablet    Other Relevant Orders    CBC with platelets differential    Comprehensive metabolic panel    Protein  random urine    Albumin Random Urine Quantitative with Creat Ratio    CBC with platelets differential    MD Instruction for Protocol    MD Instruction for Protocol 2    Treatment Conditions    Protein electrophoresis    Immunoglobulins A G and M    Immunoglobulin Total Light Chains, Urine    Kappa and lambda light chain    Protein electrophoresis random urine    Protein immunofixation urine    CBC with platelets differential    Comprehensive metabolic panel          CC: Historical Provider   ______________________________________________________________________________  Diagnosis  3/2023-evaluation for monoclonal gammopathy during evaluation for worsening chronic kidney disease.   CBC showed normal WBC, hemoglobin and platelets   Creatinine 1.93, calcium 10.2.   IgG 2999, kappa 7.38, lambda 2.02, kappa/lambda ratio 3.65, SPEP-M spike 1.9, IF- IgG kappa   UPEP showed 5.5% monoclonal protein of IgG kappa.   Beta 2 microglobulin 4.1   Skeletal survey was negative for lytic lesions   Bone marrow biopsy showed hypercellular marrow involved by CD20 positive plasma cell dyscrasia (10-20%).   No evidence of IGH::CCND1 fusion   Myeloma FISH- pending   Chromosome analysis-pending    Treatment to date  4/2023-plan to start Revlimid 10 mg (1-21/28 day cycle), dexamethasone 10 mg weekly    History of Present Illness    Ms. Estephania Rubio presented today accompanied by her daughter to review the labs/imaging and bone marrow biopsy results and next step in management.  She is in memory care unit.  No new health issues.  She does not have any bone pain.  Her appetite is  "good.    Review of systems  Apart from describing in HPI, the remainder of comprehensive ROS was negative.    Past History    Past Medical History:   Diagnosis Date     CKD (chronic kidney disease)      Diabetes mellitus, type II (H)      Hyperlipidemia      Hypertension        No past surgical history on file.    Physical Exam    BP (!) 166/99 (Patient Position: Sitting)   Pulse 77   Temp 97.5  F (36.4  C)   Resp 18   Ht 1.753 m (5' 9.02\")   SpO2 96%   BMI 30.26 kg/m      General: alert, awake, not in acute distress  HEENT: Head: Normal, normocephalic, atraumatic.  Eye: Normal external eye, conjunctiva, lids cornea, MICHAEL.  Nose: Normal external nose, mucus membranes and septum.  Pharynx: Normal buccal mucosa. Normal pharynx.  Neck / Thyroid: Supple, no masses, nodes, nodules or enlargement.  Lymphatics: No abnormally enlarged lymph nodes.  Chest: Normal chest wall and respirations. Clear to auscultation.  Heart: S1 S2 RRR, no murmur.   Abdomen: abdomen is soft without significant tenderness, masses, organomegaly or guarding  Extremities: normal strength, tone, and muscle mass  Skin: normal. no rash or abnormalities  CNS: non focal.    Lab Results    Recent Results (from the past 168 hour(s))   Renal panel   Result Value Ref Range    Sodium 138 136 - 145 mmol/L    Potassium 4.8 3.4 - 5.3 mmol/L    Chloride 105 98 - 107 mmol/L    Carbon Dioxide (CO2) 24 22 - 29 mmol/L    Anion Gap 9 7 - 15 mmol/L    Glucose 187 (H) 70 - 99 mg/dL    Urea Nitrogen 29.7 (H) 8.0 - 23.0 mg/dL    Creatinine 1.88 (H) 0.51 - 0.95 mg/dL    GFR Estimate 27 (L) >60 mL/min/1.73m2    Calcium 9.5 8.8 - 10.2 mg/dL    Albumin 3.4 (L) 3.5 - 5.2 g/dL    Phosphorus 3.1 2.5 - 4.5 mg/dL   Parathyroid Hormone Intact   Result Value Ref Range    Parathyroid Hormone Intact 110 (H) 15 - 65 pg/mL   Vitamin D Deficiency   Result Value Ref Range    Vitamin D, Total (25-Hydroxy) 36 20 - 75 ug/L   CBC with platelets   Result Value Ref Range    WBC Count 8.4 " 4.0 - 11.0 10e3/uL    RBC Count 4.31 3.80 - 5.20 10e6/uL    Hemoglobin 11.8 11.7 - 15.7 g/dL    Hematocrit 36.6 35.0 - 47.0 %    MCV 85 78 - 100 fL    MCH 27.4 26.5 - 33.0 pg    MCHC 32.2 31.5 - 36.5 g/dL    RDW 14.8 10.0 - 15.0 %    Platelet Count 279 150 - 450 10e3/uL       Imaging    No results found.    40 minutes spent on the date of the encounter doing chart review, history and exam, documentation and further activities as noted above.    Signed by: Cleopatra Schwab MD

## 2023-03-31 ENCOUNTER — PATIENT OUTREACH (OUTPATIENT)
Dept: ONCOLOGY | Facility: CLINIC | Age: 78
End: 2023-03-31
Payer: COMMERCIAL

## 2023-03-31 ENCOUNTER — TELEPHONE (OUTPATIENT)
Dept: ONCOLOGY | Facility: CLINIC | Age: 78
End: 2023-03-31
Payer: COMMERCIAL

## 2023-03-31 RX ORDER — INSULIN ASPART 100 [IU]/ML
24 INJECTION, SOLUTION INTRAVENOUS; SUBCUTANEOUS DAILY
COMMUNITY

## 2023-03-31 RX ORDER — INSULIN ASPART 100 [IU]/ML
INJECTION, SOLUTION INTRAVENOUS; SUBCUTANEOUS 3 TIMES DAILY
COMMUNITY

## 2023-03-31 RX ORDER — POLYETHYLENE GLYCOL 3350 17 G/17G
1 POWDER, FOR SOLUTION ORAL DAILY PRN
COMMUNITY

## 2023-03-31 RX ORDER — ACETAMINOPHEN 325 MG/1
650 TABLET ORAL EVERY 4 HOURS PRN
COMMUNITY

## 2023-03-31 NOTE — PROGRESS NOTES
"[facility]: Cancer Care                                                                                          From [facility], \"Bernie, staff at [facility], calls regarding appointment patient had today with Dr. Schwab. Bernie reports that they received some paperwork but the medication list is wrong. She will fax over the current medication list they have for review. They also have questions regarding the compazine that was prescribed and are looking for an order to give it. Fax number for memory care unit is [phone]. Phone number for [facility] care [phone], [facility] main number [phone]\".    Medication list received via fax from [facility].  Medication list updated in Epic.    0815, 0817\"  Tried calling [facility] Memory Care X 2 but no answer. Unable to leave message.  Called the main number and was transferred back to same number-no answer, unable to leave message.    1047:  Called [facility] but no answer, unable to leave message.    1349:  Called [facility] Memory Care but no answer, unable to leave message.    1455:  Called [facility] and spoke with Aaliyah.  Explained waiting for Revlimid to process through insurance and need delivery date. Told Aaliyah will send orders for Revlimid, dexamethasone, and compazine next week with medication calendar for Revlimid and dexamethasone. She verbalized understanding and asked if Dr. Schwab would consider ordering Zofran instead. She said compazine is considered an antipsychotic and when they have patient's on this medication, they have to fill out form and do orthostatics BP. She said they don't need to with Zofran. Told Aaliyah will discuss with Dr. Schwab.  Aaliyah asked for orders to be faxed to [phone].      Discussed with Dr. Schwab. She will order Zofran.    Signature:  Lise Kelley RN    "

## 2023-03-31 NOTE — TELEPHONE ENCOUNTER
Prior Authorization Approval    Authorization Effective Date: 1/1/2023  Authorization Expiration Date: 3/30/2024  Medication: Lenalidomide-PA approved (Brand Name Revlimid Denied)  Approved Dose/Quantity: 21/28days  Reference #: [id number]   Insurance Company: [employer] Part D - Phone [phone] Fax [phone]  Expected CoPay: $0

## 2023-04-01 ENCOUNTER — HEALTH MAINTENANCE LETTER (OUTPATIENT)
Age: 78
End: 2023-04-01

## 2023-04-01 RX ORDER — DEXAMETHASONE 4 MG/1
40 TABLET ORAL WEEKLY
Qty: 40 TABLET | Refills: 0 | Status: SHIPPED | OUTPATIENT
Start: 2023-04-01 | End: 2023-04-03 | Stop reason: DRUGHIGH

## 2023-04-01 RX ORDER — LENALIDOMIDE 10 MG/1
10 CAPSULE ORAL DAILY
Qty: 21 CAPSULE | Refills: 0 | Status: SHIPPED | OUTPATIENT
Start: 2023-04-01 | End: 2023-04-21

## 2023-04-03 ENCOUNTER — TELEPHONE (OUTPATIENT)
Dept: ONCOLOGY | Facility: CLINIC | Age: 78
End: 2023-04-03
Payer: COMMERCIAL

## 2023-04-03 DIAGNOSIS — C90.00 MULTIPLE MYELOMA NOT HAVING ACHIEVED REMISSION (H): Primary | ICD-10-CM

## 2023-04-03 RX ORDER — ONDANSETRON 4 MG/1
4 TABLET, FILM COATED ORAL EVERY 8 HOURS PRN
Qty: 30 TABLET | Refills: 1 | Status: SHIPPED | OUTPATIENT
Start: 2023-04-03

## 2023-04-03 RX ORDER — DEXAMETHASONE 2 MG/1
10 TABLET ORAL WEEKLY
Qty: 20 TABLET | Refills: 0 | Status: SHIPPED | OUTPATIENT
Start: 2023-04-03 | End: 2023-04-21

## 2023-04-03 NOTE — TELEPHONE ENCOUNTER
Living facility called and given update by Lise Kelley RN care coordinator. See patient outreach encounter on 3/31/23.    Philomena Verduzco RN

## 2023-04-03 NOTE — ORAL ONC MGMT
Oral Chemotherapy Monitoring Program    Lab Monitoring Plan  Per treatment plan  Labs drawn outside of [place]: no  Subjective/Objective:  Estephania Rubio is a 77 year old female contacted by phone for an initial visit for oral chemotherapy education. Spoke with patient's Memory Care Unit at [facility]: available at both [phone] and [phone]. No consent to communicate for daughter, and Memory Care Unit will be in charge of this medication. Spoke with AIDE Gibbs. Provided education on REMS program and that [facility] will be the filling pharmacy. I confirmed the Memory Care Unit sets up all prescriptions and handles/stores them for patients. Discussed we will need to be monitoring patient while on this medication. Also, educated that this is a hazardous medication and has specific storage and handling (including body fluids) to be aware of which are outlined in oral chemo ed sheet I am sending. Will fax oral chemotherapy education sheet on lenalidomide to their fax number [phone]. This way education is available to all nurses who might handle this medication as it doesn't seem one specific nurse will be in charge of lenalidomide.     FYI: She let me know Aaliyah Smith is the nurse manager (not specific to this unit though) but can be difficult to reach as she doesn't always work daytime hours.     Confirmed patient taking aspirin daily (discussed why this is important to continue) and will need the dexamethasone 10 mg, ondansetron, and lenalidomide orders faxed to them as well. Also included most recent provider note.         3/31/2023     9:00 AM 4/3/2023    12:00 PM   ORAL CHEMOTHERAPY   Assessment Type Initial Follow up Initial Work up;New Teach   Diagnosis Code Multiple Myeloma Multiple Myeloma   Providers Dr. Zaheer Schwab   Clinic Name/Location [facility]   Drug Name Revlimid (lenalidomide) Revlimid (lenalidomide)   Dose 10 mg 10 mg   Current Schedule Daily Daily   Cycle Details 3  "weeks on, 1 week off 3 weeks on, 1 week off   Any new drug interactions? No No   Is the dose as ordered appropriate for the patient? Yes Yes       Last PHQ-2 Score on record:        View : No data to display.                Vitals:  BP:   BP Readings from Last 1 Encounters:   03/30/23 (!) 166/99     Wt Readings from Last 1 Encounters:   03/16/23 93 kg (205 lb)     Estimated body surface area is 2.13 meters squared as calculated from the following:    Height as of 3/30/23: 1.753 m (5' 9.02\").    Weight as of 3/16/23: 93 kg (205 lb).    Labs:  _  Result Component Current Result Ref Range   Sodium 138 (3/27/2023) 136 - 145 mmol/L     _  Result Component Current Result Ref Range   Potassium 4.8 (3/27/2023) 3.4 - 5.3 mmol/L     _  Result Component Current Result Ref Range   Calcium 9.5 (3/27/2023) 8.8 - 10.2 mg/dL     No results found for Mag within last 30 days.     _  Result Component Current Result Ref Range   Phosphorus 3.1 (3/27/2023) 2.5 - 4.5 mg/dL     _  Result Component Current Result Ref Range   Albumin 3.4 (L) (3/27/2023) 3.5 - 5.2 g/dL     _  Result Component Current Result Ref Range   Urea Nitrogen 29.7 (H) (3/27/2023) 8.0 - 23.0 mg/dL     _  Result Component Current Result Ref Range   Creatinine 1.88 (H) (3/27/2023) 0.51 - 0.95 mg/dL     No results found for AST within last 30 days.     No results found for ALT within last 30 days.     No results found for BILITOTAL within last 30 days.     _  Result Component Current Result Ref Range   WBC Count 8.4 (3/27/2023) 4.0 - 11.0 10e3/uL     _  Result Component Current Result Ref Range   Hemoglobin 11.8 (3/27/2023) 11.7 - 15.7 g/dL     _  Result Component Current Result Ref Range   Platelet Count 279 (3/27/2023) 150 - 450 10e3/uL     No results found for ANC within last 30 days.     _  Result Component Current Result Ref Range   Absolute Neutrophils 5.7 (3/16/2023) 1.6 - 8.3 10e3/uL        Assessment:  Patient is appropriate to start therapy once [facility] has reached " unit and completes REMS portion.    Plan:  Basic chemotherapy teaching was reviewed with the patient's Memory Care Unit including indication, start date of therapy, dose, administration, adverse effects, missed doses, food and drug interactions, monitoring, side effect management, office contact information, and safe handling. Written materials were faxed with above information and all questions answered.    Follow-Up:  Will plan to monitor delivery through [facility] and then call one week after Estephania starts for initial assessment with the Memory Care Unit.     Corine Orlando, PharmD, BCOP  Oral Chemotherapy Pharmacist  [phone]

## 2023-04-10 ENCOUNTER — LAB REQUISITION (OUTPATIENT)
Dept: LAB | Facility: CLINIC | Age: 78
End: 2023-04-10
Payer: COMMERCIAL

## 2023-04-10 DIAGNOSIS — N18.30 CHRONIC KIDNEY DISEASE, STAGE 3 UNSPECIFIED (H): ICD-10-CM

## 2023-04-11 LAB
ALBUMIN SERPL BCG-MCNC: 3.3 G/DL (ref 3.5–5.2)
ANION GAP SERPL CALCULATED.3IONS-SCNC: 12 MMOL/L (ref 7–15)
BUN SERPL-MCNC: 24.6 MG/DL (ref 8–23)
CALCIUM SERPL-MCNC: 9.5 MG/DL (ref 8.8–10.2)
CHLORIDE SERPL-SCNC: 104 MMOL/L (ref 98–107)
CREAT SERPL-MCNC: 1.74 MG/DL (ref 0.51–0.95)
DEPRECATED HCO3 PLAS-SCNC: 24 MMOL/L (ref 22–29)
GFR SERPL CREATININE-BSD FRML MDRD: 30 ML/MIN/1.73M2
GLUCOSE SERPL-MCNC: 192 MG/DL (ref 70–99)
PHOSPHATE SERPL-MCNC: 2.9 MG/DL (ref 2.5–4.5)
POTASSIUM SERPL-SCNC: 4.3 MMOL/L (ref 3.4–5.3)
SODIUM SERPL-SCNC: 140 MMOL/L (ref 136–145)

## 2023-04-11 PROCEDURE — 36415 COLL VENOUS BLD VENIPUNCTURE: CPT | Mod: ORL | Performed by: INTERNAL MEDICINE

## 2023-04-11 PROCEDURE — 80069 RENAL FUNCTION PANEL: CPT | Mod: ORL | Performed by: INTERNAL MEDICINE

## 2023-04-11 PROCEDURE — P9603 ONE-WAY ALLOW PRORATED MILES: HCPCS | Mod: ORL | Performed by: INTERNAL MEDICINE

## 2023-04-11 NOTE — LETTER
Date:September 15, 2020      Patient was self referred, no letter generated. Do not send.        [facility] Health Information       On metoprolol  Monitor BP and titrate BP med

## 2023-04-13 ENCOUNTER — TELEPHONE (OUTPATIENT)
Dept: ONCOLOGY | Facility: CLINIC | Age: 78
End: 2023-04-13
Payer: COMMERCIAL

## 2023-04-13 NOTE — ORAL ONC MGMT
"Oral Chemotherapy Monitoring Program    Subjective/Objective:  Estephania Rubio is a 77 year old female contacted by phone for a follow-up visit for oral chemotherapy.  Spoke with RN from [facility] to confirm lenalidomide 10 mg once daily has been started as well as dexamethasone 10 mg weekly. RN was busy since this is lunch hour but did say Estephania is having some nausea today which they are trying to get Estephania to take antinausea medication. Also her blood sugars have been more elevated since now on dexamethasone which Estephania doesn't like. They are managing both at Memory care unit.         3/31/2023     9:00 AM 4/3/2023    12:00 PM 4/13/2023    10:00 AM   ORAL CHEMOTHERAPY   Assessment Type Initial Follow up Initial Work up;New Teach Initial Follow up   Diagnosis Code Multiple Myeloma Multiple Myeloma Multiple Myeloma   Providers Dr. Zaheer Schwab   Clinic Name/Location [facility]   Drug Name Revlimid (lenalidomide) Revlimid (lenalidomide) Revlimid (lenalidomide)   Dose 10 mg 10 mg 10 mg   Current Schedule Daily Daily Daily   Cycle Details 3 weeks on, 1 week off 3 weeks on, 1 week off 3 weeks on, 1 week off   Start Date of Last Cycle   4/6/2023   Doses missed in last 2 weeks   0   Adherence Assessment   Adherent   Any new drug interactions? No No No   Is the dose as ordered appropriate for the patient? Yes Yes Yes       Last PHQ-2 Score on record:        View : No data to display.                Vitals:  BP:   BP Readings from Last 1 Encounters:   03/30/23 (!) 166/99     Wt Readings from Last 1 Encounters:   03/16/23 93 kg (205 lb)     Estimated body surface area is 2.13 meters squared as calculated from the following:    Height as of 3/30/23: 1.753 m (5' 9.02\").    Weight as of 3/16/23: 93 kg (205 lb).    Labs:  _  Result Component Current Result Ref Range   Sodium 140 (4/11/2023) 136 - 145 mmol/L     _  Result Component Current Result Ref Range   Potassium 4.3 (4/11/2023) 3.4 - " 5.3 mmol/L     _  Result Component Current Result Ref Range   Calcium 9.5 (4/11/2023) 8.8 - 10.2 mg/dL     No results found for Mag within last 30 days.     _  Result Component Current Result Ref Range   Phosphorus 2.9 (4/11/2023) 2.5 - 4.5 mg/dL     _  Result Component Current Result Ref Range   Albumin 3.3 (L) (4/11/2023) 3.5 - 5.2 g/dL     _  Result Component Current Result Ref Range   Urea Nitrogen 24.6 (H) (4/11/2023) 8.0 - 23.0 mg/dL     _  Result Component Current Result Ref Range   Creatinine 1.74 (H) (4/11/2023) 0.51 - 0.95 mg/dL     No results found for AST within last 30 days.     No results found for ALT within last 30 days.     No results found for BILITOTAL within last 30 days.     _  Result Component Current Result Ref Range   WBC Count 8.4 (3/27/2023) 4.0 - 11.0 10e3/uL     _  Result Component Current Result Ref Range   Hemoglobin 11.8 (3/27/2023) 11.7 - 15.7 g/dL     _  Result Component Current Result Ref Range   Platelet Count 279 (3/27/2023) 150 - 450 10e3/uL     No results found for ANC within last 30 days.     _  Result Component Current Result Ref Range   Absolute Neutrophils 5.7 (3/16/2023) 1.6 - 8.3 10e3/uL      Assessment/Plan:  Estephania started lenalidomide and dexamethasone about a week ago. Will continue as planned.     Follow-Up:  Will review 4/20 appt with provider and then plan to call during next cycle.     Refill Due:  4/26    Corine Orlando, PharmD, BCOP  Oral Chemotherapy Pharmacist  [phone]

## 2023-04-14 ENCOUNTER — LAB REQUISITION (OUTPATIENT)
Dept: LAB | Facility: CLINIC | Age: 78
End: 2023-04-14
Payer: COMMERCIAL

## 2023-04-14 DIAGNOSIS — R53.1 WEAKNESS: ICD-10-CM

## 2023-04-15 LAB
ANION GAP SERPL CALCULATED.3IONS-SCNC: 12 MMOL/L (ref 7–15)
BASOPHILS # BLD AUTO: 0 10E3/UL (ref 0–0.2)
BASOPHILS NFR BLD AUTO: 0 %
BUN SERPL-MCNC: 31.2 MG/DL (ref 8–23)
CALCIUM SERPL-MCNC: 8.9 MG/DL (ref 8.8–10.2)
CHLORIDE SERPL-SCNC: 103 MMOL/L (ref 98–107)
CREAT SERPL-MCNC: 1.87 MG/DL (ref 0.51–0.95)
DEPRECATED HCO3 PLAS-SCNC: 25 MMOL/L (ref 22–29)
EOSINOPHIL # BLD AUTO: 0.1 10E3/UL (ref 0–0.7)
EOSINOPHIL NFR BLD AUTO: 0 %
ERYTHROCYTE [DISTWIDTH] IN BLOOD BY AUTOMATED COUNT: 14.9 % (ref 10–15)
GFR SERPL CREATININE-BSD FRML MDRD: 27 ML/MIN/1.73M2
GLUCOSE SERPL-MCNC: 106 MG/DL (ref 70–99)
HCT VFR BLD AUTO: 36 % (ref 35–47)
HGB BLD-MCNC: 11.8 G/DL (ref 11.7–15.7)
IMM GRANULOCYTES # BLD: 0.1 10E3/UL
IMM GRANULOCYTES NFR BLD: 1 %
LYMPHOCYTES # BLD AUTO: 1.2 10E3/UL (ref 0.8–5.3)
LYMPHOCYTES NFR BLD AUTO: 10 %
MCH RBC QN AUTO: 27.1 PG (ref 26.5–33)
MCHC RBC AUTO-ENTMCNC: 32.8 G/DL (ref 31.5–36.5)
MCV RBC AUTO: 83 FL (ref 78–100)
MONOCYTES # BLD AUTO: 1.2 10E3/UL (ref 0–1.3)
MONOCYTES NFR BLD AUTO: 10 %
NEUTROPHILS # BLD AUTO: 9.1 10E3/UL (ref 1.6–8.3)
NEUTROPHILS NFR BLD AUTO: 79 %
NRBC # BLD AUTO: 0 10E3/UL
NRBC BLD AUTO-RTO: 0 /100
PLATELET # BLD AUTO: 209 10E3/UL (ref 150–450)
POTASSIUM SERPL-SCNC: 4 MMOL/L (ref 3.4–5.3)
RBC # BLD AUTO: 4.35 10E6/UL (ref 3.8–5.2)
SODIUM SERPL-SCNC: 140 MMOL/L (ref 136–145)
WBC # BLD AUTO: 11.6 10E3/UL (ref 4–11)

## 2023-04-15 PROCEDURE — 80048 BASIC METABOLIC PNL TOTAL CA: CPT | Mod: ORL | Performed by: INTERNAL MEDICINE

## 2023-04-15 PROCEDURE — 36415 COLL VENOUS BLD VENIPUNCTURE: CPT | Mod: ORL | Performed by: INTERNAL MEDICINE

## 2023-04-15 PROCEDURE — 85025 COMPLETE CBC W/AUTO DIFF WBC: CPT | Mod: ORL | Performed by: INTERNAL MEDICINE

## 2023-04-15 PROCEDURE — P9604 ONE-WAY ALLOW PRORATED TRIP: HCPCS | Mod: ORL | Performed by: INTERNAL MEDICINE

## 2023-04-18 PROCEDURE — 84156 ASSAY OF PROTEIN URINE: CPT | Mod: ORL

## 2023-04-18 PROCEDURE — 84156 ASSAY OF PROTEIN URINE: CPT | Mod: ORL | Performed by: INTERNAL MEDICINE

## 2023-04-19 ENCOUNTER — LAB REQUISITION (OUTPATIENT)
Dept: LAB | Facility: CLINIC | Age: 78
End: 2023-04-19
Payer: COMMERCIAL

## 2023-04-19 DIAGNOSIS — N17.8 OTHER ACUTE KIDNEY FAILURE (H): ICD-10-CM

## 2023-04-19 DIAGNOSIS — R53.83 OTHER FATIGUE: ICD-10-CM

## 2023-04-19 LAB
ALBUMIN MFR UR ELPH: 47.6 MG/DL (ref 1–14)
COLLECT DURATION TIME UR: 24 H
PROT 24H UR-MRATE: 333.2 MG/SPECIMEN
SPECIMEN VOL UR: 700 ML

## 2023-04-20 ENCOUNTER — LAB (OUTPATIENT)
Dept: INFUSION THERAPY | Facility: CLINIC | Age: 78
End: 2023-04-20
Attending: INTERNAL MEDICINE
Payer: COMMERCIAL

## 2023-04-20 ENCOUNTER — ONCOLOGY VISIT (OUTPATIENT)
Dept: ONCOLOGY | Facility: CLINIC | Age: 78
End: 2023-04-20
Attending: INTERNAL MEDICINE
Payer: COMMERCIAL

## 2023-04-20 VITALS — BODY MASS INDEX: 30.27 KG/M2 | HEIGHT: 69 IN

## 2023-04-20 DIAGNOSIS — C90.00 MULTIPLE MYELOMA NOT HAVING ACHIEVED REMISSION (H): Primary | ICD-10-CM

## 2023-04-20 DIAGNOSIS — F02.80: ICD-10-CM

## 2023-04-20 LAB
ANION GAP SERPL CALCULATED.3IONS-SCNC: 13 MMOL/L (ref 7–15)
BUN SERPL-MCNC: 51.4 MG/DL (ref 8–23)
CALCIUM SERPL-MCNC: 8.8 MG/DL (ref 8.8–10.2)
CHLORIDE SERPL-SCNC: 100 MMOL/L (ref 98–107)
CREAT SERPL-MCNC: 4.46 MG/DL (ref 0.51–0.95)
DEPRECATED HCO3 PLAS-SCNC: 22 MMOL/L (ref 22–29)
ERYTHROCYTE [DISTWIDTH] IN BLOOD BY AUTOMATED COUNT: 14.9 % (ref 10–15)
GFR SERPL CREATININE-BSD FRML MDRD: 10 ML/MIN/1.73M2
GLUCOSE SERPL-MCNC: 147 MG/DL (ref 70–99)
HCT VFR BLD AUTO: 36.1 % (ref 35–47)
HGB BLD-MCNC: 11.7 G/DL (ref 11.7–15.7)
MCH RBC QN AUTO: 27.1 PG (ref 26.5–33)
MCHC RBC AUTO-ENTMCNC: 32.4 G/DL (ref 31.5–36.5)
MCV RBC AUTO: 84 FL (ref 78–100)
PLATELET # BLD AUTO: 209 10E3/UL (ref 150–450)
POTASSIUM SERPL-SCNC: 3.9 MMOL/L (ref 3.4–5.3)
RBC # BLD AUTO: 4.32 10E6/UL (ref 3.8–5.2)
SODIUM SERPL-SCNC: 135 MMOL/L (ref 136–145)
WBC # BLD AUTO: 11.2 10E3/UL (ref 4–11)

## 2023-04-20 PROCEDURE — P9604 ONE-WAY ALLOW PRORATED TRIP: HCPCS | Mod: ORL | Performed by: INTERNAL MEDICINE

## 2023-04-20 PROCEDURE — 99211 OFF/OP EST MAY X REQ PHY/QHP: CPT | Performed by: NURSE PRACTITIONER

## 2023-04-20 PROCEDURE — 36415 COLL VENOUS BLD VENIPUNCTURE: CPT | Mod: ORL | Performed by: INTERNAL MEDICINE

## 2023-04-20 PROCEDURE — 85027 COMPLETE CBC AUTOMATED: CPT | Mod: ORL | Performed by: INTERNAL MEDICINE

## 2023-04-20 PROCEDURE — 99214 OFFICE O/P EST MOD 30 MIN: CPT | Performed by: NURSE PRACTITIONER

## 2023-04-20 PROCEDURE — 80048 BASIC METABOLIC PNL TOTAL CA: CPT | Mod: ORL | Performed by: INTERNAL MEDICINE

## 2023-04-20 NOTE — PROGRESS NOTES
[facility] Hematology and Oncology Progress Note    Patient: Estephania Rubio  MRN: [id number]  Date of Service: Apr 20, 2023         Reason for Visit:    Mid-cycle f/up on Revlimid + dexamthasone therapies.    Assessment and Plan:    1. Multiple myeloma, stage II.     78 year old     MsJustin Rubio presents accompanied by her daughter.  She's now been on Revlimid + prednisone therapy ~3-weeks.  She resides in a memory care unit where it has been reported that her mood and affect has concernedly changed since starting therapy ... she definitely has somewhat of an aggressive mood today.  Her daughter states the changes are difficult to deal with.  Her appetite is good.  She denies cough, fever, chills, visual disturbance, bowel or bladder issues, rash.    No labs.    I had a heartfelt discussion with Estephania and her daughter.  Likely the steroids are affecting her dementia in a negative fashion.  I, again, discussed the option of close monitoring versus treatment.  Ultimately the decision was made to discontinue treatment and follow closely.    1-month f/up OFF Revlimid + prednisone therapies with MM labs 1-week in advance.    Oncology History:    1. Multiple myeloma, stage II.  FISH pending    3/2023 - Dr Schwab consult for monoclonal gammopathy during evaluation for worsening chronic kidney disease.    CBC showed normal WBC, hemoglobin and platelets    Creatinine 1.93, calcium 10.2.    IgG 2999, kappa 7.38, lambda 2.02, kappa/lambda ratio 3.65, SPEP-M spike 1.9, IF- IgG kappa    UPEP showed 5.5% monoclonal protein of IgG kappa.    Beta 2 microglobulin 4.1    Skeletal survey was negative for lytic lesions    Bone marrow biopsy showed hypercellular marrow involved by CD20 positive plasma cell dyscrasia (10-20%).    No evidence of IGH::CCND1 fusion    Myeloma FISH- pending    Chromosome analysis-pending    Based on the plasma cell percentage in the bone marrow of 10-20% with creatinine of 2 (which however has  been fluctuating renal function for the last 3 years), she meets criteria for multiple myeloma.      Unable to provide urine test today and she will complete at a later date.    Based on the current findings, the options of close monitoring with labs and exam in 2-3 months or initiating treatment with Revlimid/Dex were offered.  The patient and daughter opted for treatment with Revlimid 10 mg daily (dose adjusted based on renal function) with low-dose dexamethasone 10 mg weekly.    03/31/2023 - began Revlimid 10 mg, 3-weeks on, 1-week off, cycling every 28 day cycle +  dexamethasone 10 mg weekly    04/20/2023 - treatment DISCONTINUED d/t negative effect on mood and dementia.      ECOG Performance:    3 - Confined to bed/chair > 50% of time, capable of limited self care    Pain:         Encounter Diagnoses:    Problem List Items Addressed This Visit        Hematologic    Multiple myeloma not having achieved remission (H) - Primary   Other Visit Diagnoses     Dementia associated with other underlying disease, unspecified dementia severity, unspecified whether behavioral, psychotic, or mood disturbance or anxiety (H)                 33 minutes of time were spent on the date of this encounter with chart review, face to face time with the patient, examination, recommendations, documentation, and communication of the plan of care to the care team.     Pankaj Luu CNP     CC: Historical Provider   ____________________________________________________________    Review of Systems:    No fever or night sweats.  No loss of weight.  No lumps or bumps anywhere.  No unusual headaches or eyesight issues.  No dizziness.  No bleeding from the nose.  No sores in the mouth. No problems with swallowing.  No chest pain. No shortness of breath. No cough.  No abdominal pain. No nausea or vomiting.  No diarrhea or constipation.  No blood in stool or black colored stools.  No problems passing urine.  No numbness or tingling in hands or  "feet.  No skin rashes.    A 14 point review of systems is otherwise negative.    Past History:    Past Medical History:   Diagnosis Date     CKD (chronic kidney disease)      Diabetes mellitus, type II (H)      Hyperlipidemia      Hypertension      No past surgical history on file.    Physical Exam:    Ht 1.753 m (5' 9\")   BMI 30.27 kg/m      General:  Alert.  No acute distress.  Somewhat aggressive mood and affect.  HEENT:   Normal buccal mucosa. MICHAEL.  EOMI.  Anicteric sclera.  Lymphatics:  No abnormally enlarged lymph nodes palpated.  Chest:   Normal chest wall and respirations.  Lungs clear to auscultation.  Heart:   S1 S2 heard.  RRR.  No murmur.   Abdomen:  Soft without significant tenderness, masses, organomegaly or guarding  Extremities:  No edema.  Skin:   No rash noted.  CNS:   Non focal.    Lab Results:    No labs.    Imaging:    No results found.    "

## 2023-04-20 NOTE — LETTER
4/20/2023         RE: Estephania Rubio  [address] [place] [id number]        Dear Colleague,    Thank you for referring your patient, Estephania Rubio, to the [facility] [place]. Please see a copy of my visit note below.    [facility] Hematology and Oncology Progress Note    Patient: Estephania Rubio  MRN: [id number]  Date of Service: Apr 20, 2023         Reason for Visit:    Mid-cycle f/up on Revlimid + dexamthasone therapies.    Assessment and Plan:    1. Multiple myeloma, stage II.     78 year old     Ms. Estephania Rubio presents accompanied by her daughter.  She's now been on Revlimid + prednisone therapy ~3-weeks.  She resides in a memory care unit where it has been reported that her mood and affect has concernedly changed since starting therapy ... she definitely has somewhat of an aggressive mood today.  Her daughter states the changes are difficult to deal with.  Her appetite is good.  She denies cough, fever, chills, visual disturbance, bowel or bladder issues, rash.    No labs.    I had a heartfelt discussion with Estephania and her daughter.  Likely the steroids are affecting her dementia in a negative fashion.  I, again, discussed the option of close monitoring versus treatment.  Ultimately the decision was made to discontinue treatment and follow closely.    1-month f/up OFF Revlimid + prednisone therapies with MM labs 1-week in advance.    Oncology History:    1. Multiple myeloma, stage II.  FISH pending    3/2023 - Dr Schwab consult for monoclonal gammopathy during evaluation for worsening chronic kidney disease.    CBC showed normal WBC, hemoglobin and platelets    Creatinine 1.93, calcium 10.2.    IgG 2999, kappa 7.38, lambda 2.02, kappa/lambda ratio 3.65, SPEP-M spike 1.9, IF- IgG kappa    UPEP showed 5.5% monoclonal protein of IgG kappa.    Beta 2 microglobulin 4.1    Skeletal survey was negative for lytic lesions    Bone marrow biopsy showed hypercellular marrow  involved by CD20 positive plasma cell dyscrasia (10-20%).    No evidence of IGH::CCND1 fusion    Myeloma FISH- pending    Chromosome analysis-pending    Based on the plasma cell percentage in the bone marrow of 10-20% with creatinine of 2 (which however has been fluctuating renal function for the last 3 years), she meets criteria for multiple myeloma.      Unable to provide urine test today and she will complete at a later date.    Based on the current findings, the options of close monitoring with labs and exam in 2-3 months or initiating treatment with Revlimid/Dex were offered.  The patient and daughter opted for treatment with Revlimid 10 mg daily (dose adjusted based on renal function) with low-dose dexamethasone 10 mg weekly.    03/31/2023 - began Revlimid 10 mg, 3-weeks on, 1-week off, cycling every 28 day cycle +  dexamethasone 10 mg weekly    04/20/2023 - treatment DISCONTINUED d/t negative effect on mood and dementia.      ECOG Performance:    3 - Confined to bed/chair > 50% of time, capable of limited self care    Pain:         Encounter Diagnoses:    Problem List Items Addressed This Visit        Hematologic    Multiple myeloma not having achieved remission (H) - Primary   Other Visit Diagnoses     Dementia associated with other underlying disease, unspecified dementia severity, unspecified whether behavioral, psychotic, or mood disturbance or anxiety (H)                 33 minutes of time were spent on the date of this encounter with chart review, face to face time with the patient, examination, recommendations, documentation, and communication of the plan of care to the care team.     Pankaj Luu CNP     CC: Historical Provider   ____________________________________________________________    Review of Systems:    No fever or night sweats.  No loss of weight.  No lumps or bumps anywhere.  No unusual headaches or eyesight issues.  No dizziness.  No bleeding from the nose.  No sores in the mouth. No  "problems with swallowing.  No chest pain. No shortness of breath. No cough.  No abdominal pain. No nausea or vomiting.  No diarrhea or constipation.  No blood in stool or black colored stools.  No problems passing urine.  No numbness or tingling in hands or feet.  No skin rashes.    A 14 point review of systems is otherwise negative.    Past History:    Past Medical History:   Diagnosis Date     CKD (chronic kidney disease)      Diabetes mellitus, type II (H)      Hyperlipidemia      Hypertension      No past surgical history on file.    Physical Exam:    Ht 1.753 m (5' 9\")   BMI 30.27 kg/m      General:  Alert.  No acute distress.  Somewhat aggressive mood and affect.  HEENT:   Normal buccal mucosa. MICHAEL.  EOMI.  Anicteric sclera.  Lymphatics:  No abnormally enlarged lymph nodes palpated.  Chest:   Normal chest wall and respirations.  Lungs clear to auscultation.  Heart:   S1 S2 heard.  RRR.  No murmur.   Abdomen:  Soft without significant tenderness, masses, organomegaly or guarding  Extremities:  No edema.  Skin:   No rash noted.  CNS:   Non focal.    Lab Results:    No labs.    Imaging:    No results found.        Again, thank you for allowing me to participate in the care of your patient.        Sincerely,        Pankaj Luu, CNP    "

## 2023-04-21 ENCOUNTER — PATIENT OUTREACH (OUTPATIENT)
Dept: ONCOLOGY | Facility: CLINIC | Age: 78
End: 2023-04-21
Payer: COMMERCIAL

## 2023-04-21 ENCOUNTER — TELEPHONE (OUTPATIENT)
Dept: ONCOLOGY | Facility: CLINIC | Age: 78
End: 2023-04-21
Payer: COMMERCIAL

## 2023-04-21 NOTE — PROGRESS NOTES
[facility]: Cancer Care                                                                                          Called [facility] at [phone]  And spoke with AIDE Vickers about clinic update from 4/20/23 oncology appointment with Pankaj Luu NP. Confirmed that they had received the update as Pankaj had written on paperwork sent back with Estephania with orders to stop the Revlimid and dexamethasone. Alee did receive and did stop these medications  For Estephania. Relayed future follow up plan with labs on 5/18 and follow up with Dr. Schwab on 5/25, check in at 1245. Per Alee, labs can be done at their facility on any day, usually not on weekends. Lab apt at  will be cancelled and will instead be complete at [facility]. Daughter will be phoned with this update. Labs can be faxed to [phone] attn [facility] 1 Nurse. Labs will need a specific collection date listed on them.  Orders will be faxed.  Alee did relay that Creat that resulted from morning labs ordered by NP at their facility resulted at 4.46, Estephania was sent to [facility] to address and is currently IP.  -Pankaj was updated with lab result and intervention with IP status at [facility].  Labs to be drawn on 5/18 . New orders with this expected date will be entered by MOLLY Lira and writer will then fax to [facility] at [phone]           Signature:  Evon Prieto RN

## 2023-04-21 NOTE — ORAL ONC MGMT
Thank you for the opportunity to be a part in this patient's oral chemotherapy. The oral chemotherapy pharmacy team will no longer be following this patient for oral chemotherapy. If there are any questions or the plan changes, feel free to contact us.    Aravind Hope, PharmD, [facility]  Clinical Oncology Pharmacist  April 21, 2023

## 2023-04-22 DIAGNOSIS — C90.00 MULTIPLE MYELOMA NOT HAVING ACHIEVED REMISSION (H): Primary | ICD-10-CM

## 2023-04-24 ENCOUNTER — PATIENT OUTREACH (OUTPATIENT)
Dept: ONCOLOGY | Facility: CLINIC | Age: 78
End: 2023-04-24
Payer: COMMERCIAL

## 2023-04-24 NOTE — PROGRESS NOTES
[facility]: Cancer Care                                                                                          Lab orders with expected date of 5/18 were faxed to [facility] at  [phone]. Fax received per fax right at [phone]    Called [facility] at [phone]  And confirmed with [facility] 1 nurse that they received the faxed lab orders. AIDE Gibbs relayed that she did receive the orders. She further said that Estephania was admitted to [facility] on Friday due to high creatinine, 4.46.  She and her family declined dialysis and she was discharged back to [facility] and signed onto [facility] on 4/23/23 at 1:00 p.m. yesterday. Labs orders will be discontinued at this time and future apts cancelled.  Providers updated      Signature:  vEon Prieto RN

## 2023-05-03 LAB
CULTURE HARVEST COMPLETE DATE: NORMAL
CULTURE HARVEST COMPLETE DATE: NORMAL

## 2023-05-08 LAB
INTERPRETATION: NORMAL
INTERPRETATION: NORMAL
ISCN: NORMAL
METHODS: NORMAL

## 2023-08-27 ENCOUNTER — HEALTH MAINTENANCE LETTER (OUTPATIENT)
Age: 78
End: 2023-08-27

## 2023-09-20 ENCOUNTER — LAB REQUISITION (OUTPATIENT)
Dept: LAB | Facility: CLINIC | Age: 78
End: 2023-09-20
Payer: COMMERCIAL

## 2023-09-20 DIAGNOSIS — J09.X2 INFLUENZA DUE TO IDENTIFIED NOVEL INFLUENZA A VIRUS WITH OTHER RESPIRATORY MANIFESTATIONS: ICD-10-CM

## 2023-09-20 LAB
FLUAV RNA SPEC QL NAA+PROBE: NEGATIVE
FLUBV RNA RESP QL NAA+PROBE: NEGATIVE
RSV RNA SPEC NAA+PROBE: NEGATIVE
SARS-COV-2 RNA RESP QL NAA+PROBE: NEGATIVE

## 2023-09-20 PROCEDURE — 87637 SARSCOV2&INF A&B&RSV AMP PRB: CPT | Mod: ORL | Performed by: NURSE PRACTITIONER

## 2024-01-14 ENCOUNTER — HEALTH MAINTENANCE LETTER (OUTPATIENT)
Age: 79
End: 2024-01-14

## 2024-02-06 ENCOUNTER — LAB REQUISITION (OUTPATIENT)
Dept: LAB | Facility: CLINIC | Age: 79
End: 2024-02-06
Payer: COMMERCIAL

## 2024-02-06 DIAGNOSIS — E11.9 TYPE 2 DIABETES MELLITUS WITHOUT COMPLICATIONS (H): ICD-10-CM

## 2024-02-06 DIAGNOSIS — N18.9 CHRONIC KIDNEY DISEASE, UNSPECIFIED: ICD-10-CM

## 2024-02-06 DIAGNOSIS — C90.00 MULTIPLE MYELOMA NOT HAVING ACHIEVED REMISSION (H): ICD-10-CM

## 2024-02-07 LAB
BASOPHILS # BLD AUTO: 0.1 10E3/UL (ref 0–0.2)
BASOPHILS NFR BLD AUTO: 1 %
EOSINOPHIL # BLD AUTO: 0.1 10E3/UL (ref 0–0.7)
EOSINOPHIL NFR BLD AUTO: 1 %
ERYTHROCYTE [DISTWIDTH] IN BLOOD BY AUTOMATED COUNT: 14.5 % (ref 10–15)
ERYTHROCYTE [SEDIMENTATION RATE] IN BLOOD BY WESTERGREN METHOD: 75 MM/HR (ref 0–30)
HBA1C MFR BLD: 9 %
HCT VFR BLD AUTO: 34 % (ref 35–47)
HGB BLD-MCNC: 11.4 G/DL (ref 11.7–15.7)
IMM GRANULOCYTES # BLD: 0 10E3/UL
IMM GRANULOCYTES NFR BLD: 0 %
LYMPHOCYTES # BLD AUTO: 2 10E3/UL (ref 0.8–5.3)
LYMPHOCYTES NFR BLD AUTO: 23 %
MCH RBC QN AUTO: 28 PG (ref 26.5–33)
MCHC RBC AUTO-ENTMCNC: 33.5 G/DL (ref 31.5–36.5)
MCV RBC AUTO: 84 FL (ref 78–100)
MONOCYTES # BLD AUTO: 0.9 10E3/UL (ref 0–1.3)
MONOCYTES NFR BLD AUTO: 10 %
NEUTROPHILS # BLD AUTO: 5.7 10E3/UL (ref 1.6–8.3)
NEUTROPHILS NFR BLD AUTO: 65 %
NRBC # BLD AUTO: 0 10E3/UL
NRBC BLD AUTO-RTO: 0 /100
PLATELET # BLD AUTO: 344 10E3/UL (ref 150–450)
RBC # BLD AUTO: 4.07 10E6/UL (ref 3.8–5.2)
WBC # BLD AUTO: 8.7 10E3/UL (ref 4–11)

## 2024-02-07 PROCEDURE — 85652 RBC SED RATE AUTOMATED: CPT | Mod: ORL | Performed by: INTERNAL MEDICINE

## 2024-02-07 PROCEDURE — P9604 ONE-WAY ALLOW PRORATED TRIP: HCPCS | Mod: ORL | Performed by: INTERNAL MEDICINE

## 2024-02-07 PROCEDURE — 83036 HEMOGLOBIN GLYCOSYLATED A1C: CPT | Mod: ORL | Performed by: INTERNAL MEDICINE

## 2024-02-07 PROCEDURE — 86140 C-REACTIVE PROTEIN: CPT | Mod: ORL | Performed by: INTERNAL MEDICINE

## 2024-02-07 PROCEDURE — 36415 COLL VENOUS BLD VENIPUNCTURE: CPT | Mod: ORL | Performed by: INTERNAL MEDICINE

## 2024-02-07 PROCEDURE — 85025 COMPLETE CBC W/AUTO DIFF WBC: CPT | Mod: ORL | Performed by: INTERNAL MEDICINE

## 2024-02-07 PROCEDURE — 84550 ASSAY OF BLOOD/URIC ACID: CPT | Mod: ORL | Performed by: INTERNAL MEDICINE

## 2024-02-08 LAB
CRP SERPL-MCNC: 7.01 MG/L
URATE SERPL-MCNC: 6.2 MG/DL (ref 2.4–5.7)

## 2024-03-08 ENCOUNTER — LAB REQUISITION (OUTPATIENT)
Dept: LAB | Facility: CLINIC | Age: 79
End: 2024-03-08
Payer: COMMERCIAL

## 2024-03-08 DIAGNOSIS — N93.9 ABNORMAL UTERINE AND VAGINAL BLEEDING, UNSPECIFIED: ICD-10-CM

## 2024-03-08 DIAGNOSIS — K62.5 HEMORRHAGE OF ANUS AND RECTUM: ICD-10-CM

## 2024-03-09 ENCOUNTER — LAB REQUISITION (OUTPATIENT)
Dept: LAB | Facility: CLINIC | Age: 79
End: 2024-03-09
Payer: COMMERCIAL

## 2024-03-09 DIAGNOSIS — M93.90 OSTEOCHONDROPATHY, UNSPECIFIED OF UNSPECIFIED SITE: ICD-10-CM

## 2024-03-09 DIAGNOSIS — K62.5 HEMORRHAGE OF ANUS AND RECTUM: ICD-10-CM

## 2024-03-09 LAB
ERYTHROCYTE [DISTWIDTH] IN BLOOD BY AUTOMATED COUNT: 13.9 % (ref 10–15)
HCT VFR BLD AUTO: 35.1 % (ref 35–47)
HGB BLD-MCNC: 12.2 G/DL (ref 11.7–15.7)
MCH RBC QN AUTO: 28.4 PG (ref 26.5–33)
MCHC RBC AUTO-ENTMCNC: 34.8 G/DL (ref 31.5–36.5)
MCV RBC AUTO: 82 FL (ref 78–100)
PLATELET # BLD AUTO: 288 10E3/UL (ref 150–450)
RBC # BLD AUTO: 4.29 10E6/UL (ref 3.8–5.2)
WBC # BLD AUTO: 6.7 10E3/UL (ref 4–11)

## 2024-03-09 PROCEDURE — 85027 COMPLETE CBC AUTOMATED: CPT | Mod: ORL | Performed by: INTERNAL MEDICINE

## 2024-03-09 PROCEDURE — 36415 COLL VENOUS BLD VENIPUNCTURE: CPT | Mod: ORL | Performed by: INTERNAL MEDICINE

## 2024-03-10 LAB — INR PPP: 1.18 (ref 0.85–1.15)

## 2024-03-10 PROCEDURE — P9603 ONE-WAY ALLOW PRORATED MILES: HCPCS | Mod: ORL | Performed by: INTERNAL MEDICINE

## 2024-03-10 PROCEDURE — 36415 COLL VENOUS BLD VENIPUNCTURE: CPT | Mod: ORL | Performed by: INTERNAL MEDICINE

## 2024-03-10 PROCEDURE — 85610 PROTHROMBIN TIME: CPT | Mod: ORL | Performed by: INTERNAL MEDICINE

## 2024-03-24 ENCOUNTER — HEALTH MAINTENANCE LETTER (OUTPATIENT)
Age: 79
End: 2024-03-24

## 2024-04-18 NOTE — ADDENDUM NOTE
Addended by: MACKENZIE ARROYO on: 3/31/2023 11:08 AM     Modules accepted: Orders     Allergies reviewed.  H&P note has been confirmed for the patient. Procedural consent has been signed.  Staff has reviewed the patient's labs.  The patient has denied she is pregnant.

## 2024-04-29 ENCOUNTER — LAB REQUISITION (OUTPATIENT)
Dept: LAB | Facility: CLINIC | Age: 79
End: 2024-04-29
Payer: COMMERCIAL

## 2024-04-29 DIAGNOSIS — E11.9 TYPE 2 DIABETES MELLITUS WITHOUT COMPLICATIONS (H): ICD-10-CM

## 2024-04-30 LAB
ANION GAP SERPL CALCULATED.3IONS-SCNC: 8 MMOL/L (ref 7–15)
BASOPHILS # BLD AUTO: 0 10E3/UL (ref 0–0.2)
BASOPHILS NFR BLD AUTO: 1 %
BUN SERPL-MCNC: 19.4 MG/DL (ref 8–23)
CALCIUM SERPL-MCNC: 9.5 MG/DL (ref 8.8–10.2)
CHLORIDE SERPL-SCNC: 107 MMOL/L (ref 98–107)
CREAT SERPL-MCNC: 1.89 MG/DL (ref 0.51–0.95)
DEPRECATED HCO3 PLAS-SCNC: 23 MMOL/L (ref 22–29)
EGFRCR SERPLBLD CKD-EPI 2021: 27 ML/MIN/1.73M2
EOSINOPHIL # BLD AUTO: 0.1 10E3/UL (ref 0–0.7)
EOSINOPHIL NFR BLD AUTO: 1 %
ERYTHROCYTE [DISTWIDTH] IN BLOOD BY AUTOMATED COUNT: 14 % (ref 10–15)
GLUCOSE SERPL-MCNC: 125 MG/DL (ref 70–99)
HCT VFR BLD AUTO: 36 % (ref 35–47)
HGB BLD-MCNC: 12.3 G/DL (ref 11.7–15.7)
IMM GRANULOCYTES # BLD: 0 10E3/UL
IMM GRANULOCYTES NFR BLD: 0 %
LYMPHOCYTES # BLD AUTO: 2.2 10E3/UL (ref 0.8–5.3)
LYMPHOCYTES NFR BLD AUTO: 26 %
MCH RBC QN AUTO: 27.6 PG (ref 26.5–33)
MCHC RBC AUTO-ENTMCNC: 34.2 G/DL (ref 31.5–36.5)
MCV RBC AUTO: 81 FL (ref 78–100)
MONOCYTES # BLD AUTO: 0.9 10E3/UL (ref 0–1.3)
MONOCYTES NFR BLD AUTO: 11 %
NEUTROPHILS # BLD AUTO: 5.5 10E3/UL (ref 1.6–8.3)
NEUTROPHILS NFR BLD AUTO: 61 %
NRBC # BLD AUTO: 0 10E3/UL
NRBC BLD AUTO-RTO: 0 /100
PLATELET # BLD AUTO: 312 10E3/UL (ref 150–450)
POTASSIUM SERPL-SCNC: 4.2 MMOL/L (ref 3.4–5.3)
RBC # BLD AUTO: 4.45 10E6/UL (ref 3.8–5.2)
SODIUM SERPL-SCNC: 138 MMOL/L (ref 135–145)
WBC # BLD AUTO: 8.8 10E3/UL (ref 4–11)

## 2024-04-30 PROCEDURE — 85025 COMPLETE CBC W/AUTO DIFF WBC: CPT | Mod: ORL | Performed by: INTERNAL MEDICINE

## 2024-04-30 PROCEDURE — P9604 ONE-WAY ALLOW PRORATED TRIP: HCPCS | Mod: ORL | Performed by: INTERNAL MEDICINE

## 2024-04-30 PROCEDURE — 36415 COLL VENOUS BLD VENIPUNCTURE: CPT | Mod: ORL | Performed by: INTERNAL MEDICINE

## 2024-04-30 PROCEDURE — 80048 BASIC METABOLIC PNL TOTAL CA: CPT | Mod: ORL | Performed by: INTERNAL MEDICINE

## 2024-05-01 ENCOUNTER — LAB REQUISITION (OUTPATIENT)
Dept: LAB | Facility: CLINIC | Age: 79
End: 2024-05-01
Payer: COMMERCIAL

## 2024-05-01 DIAGNOSIS — R53.1 WEAKNESS: ICD-10-CM

## 2024-05-01 LAB
ALBUMIN UR-MCNC: 300 MG/DL
APPEARANCE UR: CLEAR
BILIRUB UR QL STRIP: NEGATIVE
COLOR UR AUTO: ABNORMAL
GLUCOSE UR STRIP-MCNC: >=1000 MG/DL
HGB UR QL STRIP: NEGATIVE
KETONES UR STRIP-MCNC: NEGATIVE MG/DL
LEUKOCYTE ESTERASE UR QL STRIP: NEGATIVE
NITRATE UR QL: NEGATIVE
PH UR STRIP: 6.5 [PH] (ref 5–7)
RBC URINE: 1 /HPF
SP GR UR STRIP: 1.01 (ref 1–1.03)
SQUAMOUS EPITHELIAL: 2 /HPF
UROBILINOGEN UR STRIP-MCNC: NORMAL MG/DL
WBC URINE: 1 /HPF

## 2024-05-01 PROCEDURE — 81001 URINALYSIS AUTO W/SCOPE: CPT | Mod: ORL | Performed by: INTERNAL MEDICINE

## 2024-06-02 ENCOUNTER — HEALTH MAINTENANCE LETTER (OUTPATIENT)
Age: 79
End: 2024-06-02

## 2024-10-20 ENCOUNTER — HEALTH MAINTENANCE LETTER (OUTPATIENT)
Age: 79
End: 2024-10-20

## 2025-01-26 ENCOUNTER — HEALTH MAINTENANCE LETTER (OUTPATIENT)
Age: 80
End: 2025-01-26

## 2025-05-03 ENCOUNTER — HEALTH MAINTENANCE LETTER (OUTPATIENT)
Age: 80
End: 2025-05-03

## 2025-06-14 ENCOUNTER — HEALTH MAINTENANCE LETTER (OUTPATIENT)
Age: 80
End: 2025-06-14